# Patient Record
Sex: FEMALE | Race: BLACK OR AFRICAN AMERICAN | NOT HISPANIC OR LATINO | Employment: OTHER | URBAN - METROPOLITAN AREA
[De-identification: names, ages, dates, MRNs, and addresses within clinical notes are randomized per-mention and may not be internally consistent; named-entity substitution may affect disease eponyms.]

---

## 2020-02-11 ENCOUNTER — OFFICE VISIT (OUTPATIENT)
Dept: OTOLARYNGOLOGY | Facility: CLINIC | Age: 60
End: 2020-02-11
Payer: COMMERCIAL

## 2020-02-11 VITALS
SYSTOLIC BLOOD PRESSURE: 113 MMHG | BODY MASS INDEX: 35.19 KG/M2 | WEIGHT: 218.06 LBS | TEMPERATURE: 99 F | HEART RATE: 77 BPM | DIASTOLIC BLOOD PRESSURE: 71 MMHG

## 2020-02-11 DIAGNOSIS — K11.1: Primary | ICD-10-CM

## 2020-02-11 PROCEDURE — 3008F PR BODY MASS INDEX (BMI) DOCUMENTED: ICD-10-PCS | Mod: CPTII,S$GLB,, | Performed by: ORTHOPAEDIC SURGERY

## 2020-02-11 PROCEDURE — 99203 OFFICE O/P NEW LOW 30 MIN: CPT | Mod: S$GLB,,, | Performed by: ORTHOPAEDIC SURGERY

## 2020-02-11 PROCEDURE — 99999 PR PBB SHADOW E&M-NEW PATIENT-LVL III: CPT | Mod: PBBFAC,,, | Performed by: ORTHOPAEDIC SURGERY

## 2020-02-11 PROCEDURE — 3008F BODY MASS INDEX DOCD: CPT | Mod: CPTII,S$GLB,, | Performed by: ORTHOPAEDIC SURGERY

## 2020-02-11 PROCEDURE — 99203 PR OFFICE/OUTPT VISIT, NEW, LEVL III, 30-44 MIN: ICD-10-PCS | Mod: S$GLB,,, | Performed by: ORTHOPAEDIC SURGERY

## 2020-02-11 PROCEDURE — 99999 PR PBB SHADOW E&M-NEW PATIENT-LVL III: ICD-10-PCS | Mod: PBBFAC,,, | Performed by: ORTHOPAEDIC SURGERY

## 2020-02-11 NOTE — PROGRESS NOTES
Subjective:       Patient ID: Georgette Powell is a 59 y.o. female.    Chief Complaint: Thyroid Problem    Patient is a very pleasant 59 year old female here to see me today for evaluation of her submandibular glands.  She has previously seen endocrinology due to thyroid nodules, and those have been biopsied and are stable.  Her endocrinologist made her aware of enlarged submandibular glands about two years ago, and she is here for evaluation.  She has seen two outside ENT's as well.  She has not required any antibiotics for sialoadenitis, and has not had any pain in the area.  She sometimes has some swelling in the area, but does not relate to eating.  She sometimes has dry mouth, she attributes to dehydration.  She did note that she had a neck lift about three years ago, just prior to noticing the enlarged submandibular glands.    Review of Systems   Constitutional: Negative for chills, fatigue, fever and unexpected weight change.   HENT: Negative for congestion, dental problem, ear discharge, ear pain, facial swelling, hearing loss, nosebleeds, postnasal drip, rhinorrhea, sinus pressure, sneezing, sore throat, tinnitus, trouble swallowing and voice change.    Eyes: Negative for redness, itching and visual disturbance.   Respiratory: Negative for cough, choking, shortness of breath and wheezing.    Cardiovascular: Negative for chest pain and palpitations.   Gastrointestinal: Negative for abdominal pain.        No reflux.   Musculoskeletal: Negative for gait problem.   Skin: Negative for rash.   Neurological: Negative for dizziness, light-headedness and headaches.       Objective:      Physical Exam   Constitutional: She is oriented to person, place, and time. She appears well-developed and well-nourished. No distress.   HENT:   Head: Normocephalic and atraumatic.   Right Ear: Tympanic membrane, external ear and ear canal normal.   Left Ear: Tympanic membrane, external ear and ear canal normal.   Nose: Nose normal.  No mucosal edema, rhinorrhea, nasal deformity or septal deviation. No epistaxis. Right sinus exhibits no maxillary sinus tenderness and no frontal sinus tenderness. Left sinus exhibits no maxillary sinus tenderness and no frontal sinus tenderness.   Mouth/Throat: Uvula is midline, oropharynx is clear and moist and mucous membranes are normal. Mucous membranes are not pale and not dry. No dental caries. No oropharyngeal exudate or posterior oropharyngeal erythema.   Eyes: Pupils are equal, round, and reactive to light. Conjunctivae, EOM and lids are normal. Right eye exhibits no chemosis. Left eye exhibits no chemosis. Right conjunctiva is not injected. Left conjunctiva is not injected. No scleral icterus. Right eye exhibits normal extraocular motion and no nystagmus. Left eye exhibits normal extraocular motion and no nystagmus.   Neck: Trachea normal and phonation normal. No tracheal tenderness present. No tracheal deviation present. Thyromegaly present. No thyroid mass present.   Ptotic submandibular glands bilaterally, symmetric and soft, no mass palpated, floor of mouth soft, no stones appreciated   Cardiovascular: Intact distal pulses.   Pulmonary/Chest: Effort normal. No stridor. No respiratory distress.   Abdominal: She exhibits no distension.   Lymphadenopathy:        Head (right side): No submental, no submandibular, no preauricular, no posterior auricular and no occipital adenopathy present.        Head (left side): No submental, no submandibular, no preauricular, no posterior auricular and no occipital adenopathy present.     She has no cervical adenopathy.   Neurological: She is alert and oriented to person, place, and time. No cranial nerve deficit.   Skin: Skin is warm and dry. No rash noted. No erythema.   Psychiatric: She has a normal mood and affect. Her behavior is normal.       Assessment:       1. Hypertrophy of submandibular gland        Plan:       1.  Submandibular gland enlargement:   Subjectively, she feels that her submandibular glands are enlarged, on physical examination there soft and benign though they are ptotic.  In retrospect, she does note that she had a neck lift just prior to noticing that her submandibular glands appeared enlarged.  We did discuss that parted the neck and face lift is often suturing the fascia beneath the submandibular glands to help elevate them.  No intervention necessary at this time.   RTC prn.

## 2020-08-04 ENCOUNTER — OFFICE VISIT (OUTPATIENT)
Dept: DERMATOLOGY | Facility: CLINIC | Age: 60
End: 2020-08-04
Payer: COMMERCIAL

## 2020-08-04 DIAGNOSIS — D22.9 BENIGN NEVUS OF SKIN: ICD-10-CM

## 2020-08-04 DIAGNOSIS — D23.9 DERMATOFIBROMA: Primary | ICD-10-CM

## 2020-08-04 DIAGNOSIS — L82.1 SEBORRHEIC KERATOSIS: ICD-10-CM

## 2020-08-04 PROCEDURE — 99202 OFFICE O/P NEW SF 15 MIN: CPT | Mod: S$GLB,,, | Performed by: DERMATOLOGY

## 2020-08-04 PROCEDURE — 99999 PR PBB SHADOW E&M-EST. PATIENT-LVL III: ICD-10-PCS | Mod: PBBFAC,,, | Performed by: DERMATOLOGY

## 2020-08-04 PROCEDURE — 99202 PR OFFICE/OUTPT VISIT, NEW, LEVL II, 15-29 MIN: ICD-10-PCS | Mod: S$GLB,,, | Performed by: DERMATOLOGY

## 2020-08-04 PROCEDURE — 99999 PR PBB SHADOW E&M-EST. PATIENT-LVL III: CPT | Mod: PBBFAC,,, | Performed by: DERMATOLOGY

## 2020-08-04 RX ORDER — VIT C/E/ZN/COPPR/LUTEIN/ZEAXAN 250MG-90MG
4 CAPSULE ORAL
COMMUNITY
End: 2023-12-14

## 2020-08-04 RX ORDER — FERROUS GLUCONATE 324(38)MG
1 TABLET ORAL
COMMUNITY
End: 2023-12-14

## 2020-08-04 RX ORDER — LANOLIN ALCOHOL/MO/W.PET/CERES
400 CREAM (GRAM) TOPICAL
COMMUNITY
End: 2023-12-14

## 2020-08-04 RX ORDER — KRILL/OM-3/DHA/EPA/PHOSPHO/AST 1000-230MG
1 CAPSULE ORAL
COMMUNITY
End: 2023-12-14

## 2020-08-04 NOTE — PROGRESS NOTES
Subjective:       Patient ID:  Georgette Powell is a 59 y.o. female who presents for   Chief Complaint   Patient presents with    Mole     left arm , hurting      History of Present Illness: The patient presents with chief complaint of mole.  Location: left arm   Duration: years, rosening x 4 days  Signs/Symptoms: hurts, pruritus    Prior treatments: none     The patient denies personal or family history of skin cancer.        Review of Systems   Constitutional: Negative for fever and chills.   Gastrointestinal: Negative for nausea and vomiting.   Skin: Positive for activity-related sunscreen use. Negative for daily sunscreen use and recent sunburn.   Hematologic/Lymphatic: Does not bruise/bleed easily.        Objective:    Physical Exam   Constitutional: She appears well-developed and well-nourished. No distress.   Neurological: She is alert and oriented to person, place, and time. She is not disoriented.   Psychiatric: She has a normal mood and affect.   Skin:   Areas Examined (abnormalities noted in diagram):   Head / Face Inspection Performed  Neck Inspection Performed  Chest / Axilla Inspection Performed  Abdomen Inspection Performed  Back Inspection Performed  RUE Inspected  LUE Inspection Performed  Nails and Digits Inspection Performed              Diagram Legend     Erythematous scaling macule/papule c/w actinic keratosis       Vascular papule c/w angioma      Pigmented verrucoid papule/plaque c/w seborrheic keratosis      Yellow umbilicated papule c/w sebaceous hyperplasia      Irregularly shaped tan macule c/w lentigo     1-2 mm smooth white papules consistent with Milia      Movable subcutaneous cyst with punctum c/w epidermal inclusion cyst      Subcutaneous movable cyst c/w pilar cyst      Firm pink to brown papule c/w dermatofibroma      Pedunculated fleshy papule(s) c/w skin tag(s)      Evenly pigmented macule c/w junctional nevus     Mildly variegated pigmented, slightly irregular-bordered macule  c/w mildly atypical nevus      Flesh colored to evenly pigmented papule c/w intradermal nevus       Pink pearly papule/plaque c/w basal cell carcinoma      Erythematous hyperkeratotic cursted plaque c/w SCC      Surgical scar with no sign of skin cancer recurrence      Open and closed comedones      Inflammatory papules and pustules      Verrucoid papule consistent consistent with wart     Erythematous eczematous patches and plaques     Dystrophic onycholytic nail with subungual debris c/w onychomycosis     Umbilicated papule    Erythematous-base heme-crusted tan verrucoid plaque consistent with inflamed seborrheic keratosis     Erythematous Silvery Scaling Plaque c/w Psoriasis     See annotation      Assessment / Plan:        Dermatofibroma  Reassurance given.  Lesions are benign.    Seborrheic keratosis  Reassurance given. Discussed diagnosis and that lesions are benign.      Benign nevus of skin  Reassurance given.  Discussed ABCDEF of melanoma and changes for patient to look for.  AAD Handout given. Discussed importance of daily use of sunscreen which is broad-spectrum and has a minimum SPF of 30.             Follow up if symptoms worsen or fail to improve.

## 2020-08-04 NOTE — PATIENT INSTRUCTIONS
Monitoring Moles  Moles, also called nevi, are small, pigmented (colored) marks on the skin. They have no known purpose. Many moles appear before age 30, but they also increase frequently as people age. Moles most often are benign (not cancer) and harmless. But some become cancerous. Thats why you need to watch the moles on your body and tell your health care provider about any concern you.    What are moles?  Moles are a type of pigmented ginny. Freckles, which often are sprinkled across the bridge of the nose, the cheeks, and the arms, are another type of pigmented ginny. Moles can appear on any part of the body. There are many types, sizes, and shapes of moles. Most moles are solid brown. In most cases, they are flat or dome-shaped, smooth, and have well-defined edges. Freckles are flat.  Why worry about moles?  Most moles are benign and dont require treatment. You can have moles removed if you dont like the way they look or feel. But moles that appear after you are 30 or that change in certain ways may become a problem. These moles may turn into melanoma, a type of skin cancer. Melanoma is one of the fastest growing cancers in the [place], but it is often curable if caught early. But this disease can be life-threatening, particularly when not diagnosed early. The more moles someone has, the higher the risk. Risk is also higher for those who have had more lifetime exposure to the sun, severe blistering sunburns, exposure to tanning beds, a prior personal history of cancer, and those with a family history of skin cancer. To manage your risk, its smart to check your moles for changes and ask your health care provider to perform a thorough skin exam when you have a physical exam. To do this, you first need to learn where your moles are. Then, be sure to check your moles each month.    Checking your moles  You can check many of your moles each month. You can do this right after you shower and before you get  dressed. Check your body from head to toe. Then, make a list of your moles. If you find any new moles or changes in your moles, call your health care provider. To check your moles, youll need:  · A full-length mirror  · A stool or chair to sit on while you check your feet  If you have a lot of moles, take digital photos of them each month. Make sure to take photos both up close and from a distance. These can help you see if any moles change over time.  When to seek medical treatment  See your health care provider if your moles hurt, itch, ooze, bleed, thicken, become crusty, or show other changes. Also, be sure to call your health care provider if your moles show any of the following signs of melanoma:  · A change in size, shape, color, or elevation  · Asymmetry (when the sides dont match)  · Ragged, notched, or blurred borders  · Varied colors within the same mole  · Size is larger than 5 mm or 6 mm in diameter (the size of a pencil eraser)  © 9489-2991 [employer]. [address] [place] [address]. All rights reserved. This information is not intended as a substitute for professional medical care. Always follow your healthcare professional's instructions.

## 2023-08-11 ENCOUNTER — TELEPHONE (OUTPATIENT)
Dept: GASTROENTEROLOGY | Facility: CLINIC | Age: 63
End: 2023-08-11
Payer: COMMERCIAL

## 2023-08-11 NOTE — TELEPHONE ENCOUNTER
----- Message from Piedad Rodriguez sent at 8/10/2023  2:34 PM CDT -----  Name of Who is Calling:Patient           What is the request in detail:Patient is requesting a call regarding colonoscopy due in Jan 2024           Can the clinic reply by [facility]:no           What Number to Call Back if not in [facility]: [phone]

## 2023-08-11 NOTE — TELEPHONE ENCOUNTER
Returned call to pt.     Pt states it will be time for her colonoscopy in Jan 2024 and wants Dr Escamilla to do it.     Advised her that he does Admin duties also and is a little more difficult to get in with however either she can see her PCP and request the colonoscopy and ask for Dr Escamilla or she can see GI and request Dr Escamilla.     She agreed to plan and will call back when the time get closer.

## 2023-09-12 ENCOUNTER — TELEPHONE (OUTPATIENT)
Dept: PREADMISSION TESTING | Facility: HOSPITAL | Age: 63
End: 2023-09-12
Payer: COMMERCIAL

## 2023-09-13 ENCOUNTER — TELEPHONE (OUTPATIENT)
Dept: GASTROENTEROLOGY | Facility: CLINIC | Age: 63
End: 2023-09-13
Payer: COMMERCIAL

## 2023-09-13 NOTE — TELEPHONE ENCOUNTER
----- Message from Gaye Murillo LPN sent at 9/12/2023  3:39 PM CDT -----  Contact: Georgette    ----- Message -----  From: Ijeoma Oneil  Sent: 9/12/2023   1:37 PM CDT  To: [facility] Staff    Georgette needs a call back at [phone], Regards to getting an appointment schedule or orders in to have a upper and lower GI done.    Thanks  Td

## 2023-09-13 NOTE — TELEPHONE ENCOUNTER
Returned pts call. Advised her the first available appt with GI is 02/22/23. Scheduled appt. Advised pt to contact her PCP to see if he can order the EGD and colonoscopy, have him to send it to the Endoscopy schedulers and she may can be scoped before 02/2024.  She states she will keep the appt scheduled for now and will contact her PCP.

## 2023-12-18 ENCOUNTER — TELEPHONE (OUTPATIENT)
Dept: SPORTS MEDICINE | Facility: CLINIC | Age: 63
End: 2023-12-18
Payer: COMMERCIAL

## 2023-12-18 NOTE — TELEPHONE ENCOUNTER
Called pt to get more information regarding where her leg pain was located so we could get appropriate xrays prior to appointment. Pt was unable to explain exactly where he pain was located over the phone and denied locations I inquired about (hip, knee, etc).  Will evaluate pt at appointment and determine if xr are needed

## 2023-12-19 ENCOUNTER — TELEPHONE (OUTPATIENT)
Dept: PHYSICAL MEDICINE AND REHAB | Facility: CLINIC | Age: 63
End: 2023-12-19
Payer: COMMERCIAL

## 2023-12-19 ENCOUNTER — LAB VISIT (OUTPATIENT)
Dept: LAB | Facility: HOSPITAL | Age: 63
End: 2023-12-19
Attending: STUDENT IN AN ORGANIZED HEALTH CARE EDUCATION/TRAINING PROGRAM
Payer: COMMERCIAL

## 2023-12-19 ENCOUNTER — OFFICE VISIT (OUTPATIENT)
Dept: SPORTS MEDICINE | Facility: CLINIC | Age: 63
End: 2023-12-19
Payer: COMMERCIAL

## 2023-12-19 DIAGNOSIS — M79.604 RIGHT LEG PAIN: Primary | ICD-10-CM

## 2023-12-19 DIAGNOSIS — M79.604 RIGHT LEG PAIN: ICD-10-CM

## 2023-12-19 LAB
ALBUMIN SERPL BCP-MCNC: 4 G/DL (ref 3.5–5.2)
ALP SERPL-CCNC: 60 U/L (ref 55–135)
ALT SERPL W/O P-5'-P-CCNC: 9 U/L (ref 10–44)
ANION GAP SERPL CALC-SCNC: 8 MMOL/L (ref 8–16)
AST SERPL-CCNC: 15 U/L (ref 10–40)
BASOPHILS # BLD AUTO: 0.03 K/UL (ref 0–0.2)
BASOPHILS NFR BLD: 0.6 % (ref 0–1.9)
BILIRUB SERPL-MCNC: 0.4 MG/DL (ref 0.1–1)
BUN SERPL-MCNC: 14 MG/DL (ref 8–23)
CALCIUM SERPL-MCNC: 9.8 MG/DL (ref 8.7–10.5)
CHLORIDE SERPL-SCNC: 105 MMOL/L (ref 95–110)
CO2 SERPL-SCNC: 26 MMOL/L (ref 23–29)
CREAT SERPL-MCNC: 0.8 MG/DL (ref 0.5–1.4)
CRP SERPL-MCNC: 0.4 MG/L (ref 0–8.2)
DIFFERENTIAL METHOD: ABNORMAL
EOSINOPHIL # BLD AUTO: 0.1 K/UL (ref 0–0.5)
EOSINOPHIL NFR BLD: 2 % (ref 0–8)
ERYTHROCYTE [DISTWIDTH] IN BLOOD BY AUTOMATED COUNT: 13.7 % (ref 11.5–14.5)
EST. GFR  (NO RACE VARIABLE): >60 ML/MIN/1.73 M^2
GLUCOSE SERPL-MCNC: 78 MG/DL (ref 70–110)
HCT VFR BLD AUTO: 37.6 % (ref 37–48.5)
HGB BLD-MCNC: 11.7 G/DL (ref 12–16)
IMM GRANULOCYTES # BLD AUTO: 0.01 K/UL (ref 0–0.04)
IMM GRANULOCYTES NFR BLD AUTO: 0.2 % (ref 0–0.5)
LYMPHOCYTES # BLD AUTO: 1.9 K/UL (ref 1–4.8)
LYMPHOCYTES NFR BLD: 37.9 % (ref 18–48)
MCH RBC QN AUTO: 27.3 PG (ref 27–31)
MCHC RBC AUTO-ENTMCNC: 31.1 G/DL (ref 32–36)
MCV RBC AUTO: 88 FL (ref 82–98)
MONOCYTES # BLD AUTO: 0.4 K/UL (ref 0.3–1)
MONOCYTES NFR BLD: 7.7 % (ref 4–15)
NEUTROPHILS # BLD AUTO: 2.6 K/UL (ref 1.8–7.7)
NEUTROPHILS NFR BLD: 51.6 % (ref 38–73)
NRBC BLD-RTO: 0 /100 WBC
PLATELET # BLD AUTO: 183 K/UL (ref 150–450)
PMV BLD AUTO: 12.8 FL (ref 9.2–12.9)
POTASSIUM SERPL-SCNC: 4.2 MMOL/L (ref 3.5–5.1)
PROT SERPL-MCNC: 7.7 G/DL (ref 6–8.4)
RBC # BLD AUTO: 4.28 M/UL (ref 4–5.4)
SODIUM SERPL-SCNC: 139 MMOL/L (ref 136–145)
WBC # BLD AUTO: 5.09 K/UL (ref 3.9–12.7)

## 2023-12-19 PROCEDURE — 80053 COMPREHEN METABOLIC PANEL: CPT | Performed by: STUDENT IN AN ORGANIZED HEALTH CARE EDUCATION/TRAINING PROGRAM

## 2023-12-19 PROCEDURE — 1159F MED LIST DOCD IN RCRD: CPT | Mod: CPTII,S$GLB,, | Performed by: STUDENT IN AN ORGANIZED HEALTH CARE EDUCATION/TRAINING PROGRAM

## 2023-12-19 PROCEDURE — 99999 PR PBB SHADOW E&M-EST. PATIENT-LVL III: CPT | Mod: PBBFAC,,, | Performed by: STUDENT IN AN ORGANIZED HEALTH CARE EDUCATION/TRAINING PROGRAM

## 2023-12-19 PROCEDURE — 99204 PR OFFICE/OUTPT VISIT, NEW, LEVL IV, 45-59 MIN: ICD-10-PCS | Mod: S$GLB,,, | Performed by: STUDENT IN AN ORGANIZED HEALTH CARE EDUCATION/TRAINING PROGRAM

## 2023-12-19 PROCEDURE — 99204 OFFICE O/P NEW MOD 45 MIN: CPT | Mod: S$GLB,,, | Performed by: STUDENT IN AN ORGANIZED HEALTH CARE EDUCATION/TRAINING PROGRAM

## 2023-12-19 PROCEDURE — 1160F PR REVIEW ALL MEDS BY PRESCRIBER/CLIN PHARMACIST DOCUMENTED: ICD-10-PCS | Mod: CPTII,S$GLB,, | Performed by: STUDENT IN AN ORGANIZED HEALTH CARE EDUCATION/TRAINING PROGRAM

## 2023-12-19 PROCEDURE — 36415 COLL VENOUS BLD VENIPUNCTURE: CPT | Performed by: STUDENT IN AN ORGANIZED HEALTH CARE EDUCATION/TRAINING PROGRAM

## 2023-12-19 PROCEDURE — 1160F RVW MEDS BY RX/DR IN RCRD: CPT | Mod: CPTII,S$GLB,, | Performed by: STUDENT IN AN ORGANIZED HEALTH CARE EDUCATION/TRAINING PROGRAM

## 2023-12-19 PROCEDURE — 99999 PR PBB SHADOW E&M-EST. PATIENT-LVL III: ICD-10-PCS | Mod: PBBFAC,,, | Performed by: STUDENT IN AN ORGANIZED HEALTH CARE EDUCATION/TRAINING PROGRAM

## 2023-12-19 PROCEDURE — 85652 RBC SED RATE AUTOMATED: CPT | Performed by: STUDENT IN AN ORGANIZED HEALTH CARE EDUCATION/TRAINING PROGRAM

## 2023-12-19 PROCEDURE — 1159F PR MEDICATION LIST DOCUMENTED IN MEDICAL RECORD: ICD-10-PCS | Mod: CPTII,S$GLB,, | Performed by: STUDENT IN AN ORGANIZED HEALTH CARE EDUCATION/TRAINING PROGRAM

## 2023-12-19 PROCEDURE — 85025 COMPLETE CBC W/AUTO DIFF WBC: CPT | Performed by: STUDENT IN AN ORGANIZED HEALTH CARE EDUCATION/TRAINING PROGRAM

## 2023-12-19 PROCEDURE — 86140 C-REACTIVE PROTEIN: CPT | Performed by: STUDENT IN AN ORGANIZED HEALTH CARE EDUCATION/TRAINING PROGRAM

## 2023-12-19 RX ORDER — GABAPENTIN 100 MG/1
100 CAPSULE ORAL 3 TIMES DAILY
Qty: 90 CAPSULE | Refills: 1 | Status: SHIPPED | OUTPATIENT
Start: 2023-12-19 | End: 2024-02-12

## 2023-12-19 NOTE — TELEPHONE ENCOUNTER
----- Message from Odette Nolasco sent at 12/19/2023  2:36 PM CST -----  Contact: Georgette  .Type:  Patient Returning Call     Who Called : Georgette   Who Left Message for Patient: Dayna Doyle LPN  Does the patient know what this is regarding?:   Would the patient rather a call back or a response via [facility]?  Call   Best Call Back Number: [phone]              Thanks

## 2023-12-19 NOTE — PATIENT INSTRUCTIONS
Assessment:  Georgette Powell is a 63 y.o. female with a chief complaint of Pain of the Right Leg    Encounter Diagnosis   Name Primary?    Right leg pain Yes      Plan:  Unclear etiology of pain and symptoms based on history and clinical exam.  Suspect possible radiculopathy affecting the right lower extremity.  No signs to suggest hip or knee pathology.    Recommend to obtain EMG/NCS of the right side, evaluate for radiculopathy.    We will obtain lab - CBC, CMP, ESR, CRP   Can trial gabapentin, 100 mg, 3 times daily  We will follow up after EMG, discuss results, determine the next best course of treatment.    Follow-up:  After EMG/NCS or sooner if there are any problems between now and then.    Thank you for choosing [facility] and Dr. Breezy Henderson for your orthopedic & sports medicine care. It is our goal to provide you with exceptional care that will help keep you healthy, active, and get you back in the game.    Please do not hesitate to reach out to us via email, phone, or MyChart with any questions, concerns, or feedback.    If you are experiencing pain/discomfort ,or have questions after 5pm and would like to be connected to the [facility] on-call team, please call this number and specify which Sports Medicine provider is treating you: [phone]

## 2023-12-19 NOTE — PROGRESS NOTES
Patient ID: Georgette Powell  YOB: 1960  MRN: [id number]    Chief Complaint: Pain of the Right Leg    Referred By: Self    Occupation: Retired    History of Present Illness: Georgette Powell is a 63 y.o. female who presents today with Pain of the Right Leg    She complains of chronic right leg pain since late 2022 without any injuries.  She describes a toothache pain throughout her whole leg that worsens with prolonged walking, sitting, or when flexing her hip.  She has tried using OTC medications and Voltaren gel but the pain persists.  She has not had any formal workup of this before.    Past Medical History:   Past Medical History:   Diagnosis Date    Cataract     Endometriosis of colon 1995    took steroids which relieved the problems    Iron deficiency anemia     due to fibroids    Uterine fibroid      Past Surgical History:   Procedure Laterality Date    BREAST BIOPSY Left     Benign    CATARACT EXTRACTION Right 01/09/2020    CERVICAL BIOPSY  W/ LOOP ELECTRODE EXCISION      COLONOSCOPY  2014    Normal    COLPOSCOPY      [facility]      MYOMECTOMY      REDUCTION OF BOTH BREASTS      TOTAL REDUCTION MAMMOPLASTY Bilateral     2016    UTERINE FIBROID SURGERY       Family History   Problem Relation Age of Onset    Diabetes Maternal Grandmother     Stroke Maternal Grandmother     Diabetes Mother     Hypertension Mother     Breast cancer Neg Hx     Cataracts Mother     Cancer Maternal Aunt         breast    Colon cancer Neg Hx     Stomach cancer Neg Hx      Social History     Socioeconomic History    Marital status: Single    Number of children: 0   Occupational History    Occupation: [employer]      Employer:  [employer]   Tobacco Use    Smoking status: Never    Smokeless tobacco: Never   Substance and Sexual Activity    Alcohol use: Yes     Comment: occasionally    Drug use: Never    Sexual activity: Yes   Social History Narrative    ** Merged History Encounter **          Medication List with Changes/Refills    New Medications    GABAPENTIN (NEURONTIN) 100 MG CAPSULE    Take 1 capsule (100 mg total) by mouth 3 (three) times daily.   Current Medications    ESTRADIOL (ESTRACE) 1 MG TABLET    Take 1 tablet (1 mg total) by mouth once daily.    PROGESTERONE (PROMETRIUM) 100 MG CAPSULE    Take 1 capsule (100 mg total) by mouth once daily.     Review of patient's allergies indicates:  No Known Allergies    Physical Exam:   There is no height or weight on file to calculate BMI.    Physical Exam  Detailed MSK exam:     Right Leg:  Inspection: No swelling, erythema or ecchymosis.   Palpation tenderness: Nontender to palpation  Range of motion: 100 deg Flexion with pain at end range         40 deg IR         65 deg ER  Strength:  5/5 Extension    5/5 Flexion    5/5 Abduction    5/5 Adduction  Special Tests: Negative Log Roll    Negative JYOTSNA    Negative FADIR    Negative Circumduction    Negative SLR  N/V Exam:  Tibial:    Normal sensory (plantar foot)  Normal motor (FHL)    Sup Peroneal:   Normal sensory (dorsal foot)  Normal motor (Peroneals)            Deep Peroneal:   Normal sensory (1st web space)  Normal motor (EHL)    Sural:   Normal sensory (lateral foot)   Saphenous:   Normal sensory (medial lower leg)   Normal pedal pulses, warm and well perfused with capillary refill < 2 sec       Imaging:  No pertinent imaging to review        Patient Instructions   Assessment:  Georgette Powell is a 63 y.o. female with a chief complaint of Pain of the Right Leg    Encounter Diagnosis   Name Primary?    Right leg pain Yes      Plan:  Unclear etiology of pain and symptoms based on history and clinical exam.  Suspect possible radiculopathy affecting the right lower extremity.  No signs to suggest hip or knee pathology.    Recommend to obtain EMG/NCS of the right side, evaluate for radiculopathy.    We will obtain lab - CBC, CMP, ESR, CRP   Can trial gabapentin, 100 mg, 3 times daily  We will follow up after EMG, discuss results, determine  the next best course of treatment.    Follow-up:  After EMG/NCS or sooner if there are any problems between now and then.    Thank you for choosing [facility] and Dr. Breezy Henderson for your orthopedic & sports medicine care. It is our goal to provide you with exceptional care that will help keep you healthy, active, and get you back in the game.    Please do not hesitate to reach out to us via email, phone, or MyChart with any questions, concerns, or feedback.    If you are experiencing pain/discomfort ,or have questions after 5pm and would like to be connected to the [facility] on-call team, please call this number and specify which Sports Medicine provider is treating you: [phone]      A copy of today's visit note has been sent to the referring provider.           Breezy Henderson MD  Primary Care Sports Medicine    Disclaimer: This note was prepared using a voice recognition system and is likely to have sound alike errors within the text.

## 2023-12-20 ENCOUNTER — PATIENT MESSAGE (OUTPATIENT)
Dept: SPORTS MEDICINE | Facility: CLINIC | Age: 63
End: 2023-12-20
Payer: COMMERCIAL

## 2023-12-20 LAB — ERYTHROCYTE [SEDIMENTATION RATE] IN BLOOD BY PHOTOMETRIC METHOD: 28 MM/HR (ref 0–36)

## 2024-01-05 ENCOUNTER — OFFICE VISIT (OUTPATIENT)
Dept: PHYSICAL MEDICINE AND REHAB | Facility: CLINIC | Age: 64
End: 2024-01-05
Payer: COMMERCIAL

## 2024-01-05 VITALS
HEART RATE: 76 BPM | RESPIRATION RATE: 13 BRPM | BODY MASS INDEX: 37.77 KG/M2 | DIASTOLIC BLOOD PRESSURE: 77 MMHG | HEIGHT: 66 IN | WEIGHT: 235 LBS | SYSTOLIC BLOOD PRESSURE: 128 MMHG

## 2024-01-05 DIAGNOSIS — M54.16 LUMBAR RADICULOPATHY: ICD-10-CM

## 2024-01-05 PROCEDURE — 95908 NRV CNDJ TST 3-4 STUDIES: CPT | Mod: S$GLB,,, | Performed by: PHYSICAL MEDICINE & REHABILITATION

## 2024-01-05 PROCEDURE — 99999 PR PBB SHADOW E&M-EST. PATIENT-LVL III: CPT | Mod: PBBFAC,,, | Performed by: PHYSICAL MEDICINE & REHABILITATION

## 2024-01-05 PROCEDURE — 99499 UNLISTED E&M SERVICE: CPT | Mod: S$GLB,,, | Performed by: PHYSICAL MEDICINE & REHABILITATION

## 2024-01-05 PROCEDURE — 95885 MUSC TST DONE W/NERV TST LIM: CPT | Mod: S$GLB,,, | Performed by: PHYSICAL MEDICINE & REHABILITATION

## 2024-01-05 NOTE — PROGRESS NOTES
[facility]   [address]  [place] [address]  Phone: [phone]        Full Name: Georgette Powell YOB: 1960  Patient ID: [id number]      Visit Date: 1/5/2024 12:46  Age: 63 Years 3 Months Old  Examining Physician: Dolly Villanueva M.D.  Referring Physician:   Reason for Referral: lower ext      Chief Complaint   Patient presents with    Leg Pain       HPI: This is a 63 y.o.  female being seen in clinic today for evaluation of chronic deep, achy pain in her low back and down into her right leg. Her symptoms are worse with prolonged standing/walking but can bother her at rest as well.  Position change provides some relief.    History obtained from patient    Past family, medical, social, and surgical history reviewed in chart    Review of Systems:     General- denies lethargy, weight change, fever, chills  Head/neck- denies swallowing difficulties  ENT- denies hearing changes  Cardiovascular-denies chest pain  Pulmonary- denies shortness of breath  GI- denies constipation or bowel incontinence  - denies bladder incontinence  Skin- denies wounds or rashes  Musculoskeletal- denies weakness, + pain  Neurologic- +/- numbness and tingling  Psychiatric- denies depressive or psychotic features, denies anxiety  Lymphatic-denies swelling  Endocrine- denies hypoglycemic symptoms/DM history  All other pertinent systems negative     Physical Examination:  General: Well developed, well nourished female, NAD, +obese habitus  HEENT:NCAT EOMI bilaterally   Pulmonary:Normal respirations    Spinal Examination: CERVICAL  Active ROM is within normal limits.  Inspection: No deformity of spinal alignment.      Spinal Examination: LUMBAR or THORACIC  Active ROM is within normal limits.  Inspection: No deformity of spinal alignment.    Slr neg bilaterally    Bilateral Upper and Lower Extremities:  Pulses are 2+ at radial bilaterally.  Shoulder/Elbow/Wrist/Hand ROM   Hip/Knee/Ankle ROM  wnl  Bilateral Extremities show normal capillary refill.  No signs of cyanosis, rubor, edema, skin changes, or dysvascular changes of appendages.  Nails appear intact.    Neurological Exam:  Cranial Nerves:  II-XII grossly intact    Manual Muscle Testing: (Motor 5=normal)  5/5 strength bilateral lower extremities    No focal atrophy is noted of either lower extremity.    Bilateral Reflexes:  No clonus at knee or ankle.    Sensation: tested to light touch  - intact in legs    Gait: Narrow base and good arm swing.      Entire procedure explained to patient prior to proceeding.  Verbal consent obtained    SNC      Nerve / Sites Rec. Site Onset Lat Peak Lat Amp Segments Distance Velocity     ms ms µV  mm m/s   R Sural - Ankle (Calf)      Calf Ankle 3.5 4.1 10.7 Calf - Ankle 140 40   R Superficial peroneal - Ankle      Lat leg Ankle 2.7 3.2 18.9 Lat leg - Ankle 140 52       MNC      Nerve / Sites Muscle Latency Amplitude Duration Rel Amp Segments Distance Lat Diff Velocity     ms mV ms %  mm ms m/s   R Peroneal - EDB      Ankle EDB 4.0 3.5 6.0 100 Ankle - EDB 80        Fibular head EDB 11.3 3.0 6.2 86.6 Fibular head - Ankle 340 7.3 46      Pop fossa EDB 12.8 3.0 5.9 99 Pop fossa - Fibular head 70 1.5 46   R Tibial - AH      Ankle AH 5.8 5.6 3.9 100 Ankle - Ankle 80        Pop fossa AH 16.3 3.2 4.0 56.7 Pop fossa - Ankle 420 10.5 40       EMG         EMG Summary Table     Spontaneous MUAP Recruitment   Muscle IA Fib PSW Fasc Other Dur. Dur Amp Dur Polys Pattern Effort   R. Rectus femoris N None None None . _NFT_ _NFT_ N N N N Max   R. Extensor digitorum brevis N None None None . _NFT_ _NFT_ N N 1+ sl red Max   R. Abductor hallucis Incr 1+ None None . _NFT_ _NFT_ N Sl Incr 1+ Reduced Max                              INTERPRETATION    -Right superficial peroneal sensory nerve conduction study showed normal peak latency and amplitude  -Right sural sensory nerve conduction study showed normal peak latency and  amplitude  -Right peroneal motor nerve conduction study showed normal latency, amplitude, and conduction velocity  -Right tibial motor nerve conduction study showed normal latency, amplitude, and conduction velocity *prox stim limited due to habitus  -Needle EMG examination performed to above mentioned muscles     IMPRESSION  ABNORMAL study  2. There is electrodiagnostic evidence of a mild acute on chronic radiculopathy of the S1 nerve root and a chronic radiculopathy of the L5 nerve root    PLAN  Discussed in detail for greater than 30 minutes about diagnosis and treatment plan    1. Follow up with referring provider: Dr. Breezy Henderson  2. Handouts on lumbar radic and exercise provided. Rec referral to PT and IPM  3. This study is good for one year. If symptoms worsen or do not improve, please re-consult.    Dolly Villanueva M.D.  Physical Medicine and Rehab

## 2024-01-08 NOTE — PROGRESS NOTES
Patient ID: Georgette Powell  YOB: 1960  MRN: [id number]    Chief Complaint: Pain of the Right Leg    Referred By: Self    Occupation: Retired    History of Present Illness: Georgette Powell is a 63 y.o. female who presents today with Pain of the Right Leg    She was initially evaluated in our office on 12/19/23 for right leg pain that had been present since 2022.  Presentation was concerning for lower extremity neuropathy.   She was referred for an EMG and labs to evaluate further.  She was prescribed gabapentin 100mg TID.    Today, she reports mild improvement in her symptoms.  It has not as bothersome.  She has been taking gabapentin 100 mg 3 times daily.  No other concerns today.    HPI 12/19/23:  She complains of chronic right leg pain since late 2022 without any injuries.  She describes a toothache pain throughout her whole leg that worsens with prolonged walking, sitting, or when flexing her hip.  She has tried using OTC medications and Voltaren gel but the pain persists.  She has not had any formal workup of this before.    Past Medical History:   Past Medical History:   Diagnosis Date    Cataract     Endometriosis of colon 1995    took steroids which relieved the problems    Iron deficiency anemia     due to fibroids    Uterine fibroid      Past Surgical History:   Procedure Laterality Date    BREAST BIOPSY Left     Benign    CATARACT EXTRACTION Right 01/09/2020    CERVICAL BIOPSY  W/ LOOP ELECTRODE EXCISION      COLONOSCOPY  2014    Normal    COLPOSCOPY      [facility]      MYOMECTOMY      REDUCTION OF BOTH BREASTS      TOTAL REDUCTION MAMMOPLASTY Bilateral     2016    UTERINE FIBROID SURGERY       Family History   Problem Relation Age of Onset    Diabetes Maternal Grandmother     Stroke Maternal Grandmother     Diabetes Mother     Hypertension Mother     Breast cancer Neg Hx     Cataracts Mother     Cancer Maternal Aunt         breast    Colon cancer Neg Hx     Stomach cancer Neg Hx      Social  History     Socioeconomic History    Marital status: Single    Number of children: 0   Occupational History    Occupation: [employer]      Employer:  [employer]   Tobacco Use    Smoking status: Never    Smokeless tobacco: Never   Substance and Sexual Activity    Alcohol use: Yes     Comment: occasionally    Drug use: Never    Sexual activity: Yes   Social History Narrative    ** Merged History Encounter **          Medication List with Changes/Refills   Current Medications    ESTRADIOL (ESTRACE) 1 MG TABLET    Take 1 tablet (1 mg total) by mouth once daily.    GABAPENTIN (NEURONTIN) 100 MG CAPSULE    Take 1 capsule (100 mg total) by mouth 3 (three) times daily.    PROGESTERONE (PROMETRIUM) 100 MG CAPSULE    Take 1 capsule (100 mg total) by mouth once daily.     Review of patient's allergies indicates:  No Known Allergies    Physical Exam:   There is no height or weight on file to calculate BMI.    Physical Exam  Detailed MSK exam:     Right Leg:  Inspection: No swelling, erythema or ecchymosis.   Palpation tenderness: Nontender to palpation  Range of motion: 100 deg Flexion with pain at end range         40 deg IR         65 deg ER  Strength:  5/5 Extension    5/5 Flexion    5/5 Abduction    5/5 Adduction  Special Tests: Negative Log Roll    Negative JYOTSNA    Negative FADIR    Negative Circumduction    Negative SLR  N/V Exam:  Tibial:    Normal sensory (plantar foot)  Normal motor (FHL)    Sup Peroneal:   Normal sensory (dorsal foot)  Normal motor (Peroneals)            Deep Peroneal:   Normal sensory (1st web space)  Normal motor (EHL)    Sural:   Normal sensory (lateral foot)   Saphenous:   Normal sensory (medial lower leg)   Normal pedal pulses, warm and well perfused with capillary refill < 2 sec       Imaging:    EMG reviewed - 01/05/2024  Mild acute on chronic radiculopathy of the S1 nerve root.    Mild chronic radiculopathy of the L5 nerve root.    Patient Instructions   Assessment:  Georgette Powell is a 63 y.o.  female with a chief complaint of Pain of the Right Leg    Encounter Diagnosis   Name Primary?    Right lumbosacral radiculopathy Yes      Plan:  EMG reviewed, showing mild acute on chronic radiculopathy of S1 nerve root as well as chronic radiculopathy of the L5 nerve root.  Discussed these findings, this is consistent with her pain and symptoms.  Pain has improved somewhat already, with activity modifications and gabapentin.    Would recommend formal physical therapy, and we will refer to [facility] in [place].  Anticipate 2-3 times a week for at least the next 6-8 weeks.    Can continue gabapentin, 100 mg, 3 times daily, as needed.    2-3 months, would recommend referral to interventional pain department to discuss possible intervention/injections.    Follow-up:  2-3 months, if needed, or sooner if there are any problems between now and then.    Thank you for choosing [facility] and Dr. Breezy Henderson for your orthopedic & sports medicine care. It is our goal to provide you with exceptional care that will help keep you healthy, active, and get you back in the game.    Please do not hesitate to reach out to us via email, phone, or MyChart with any questions, concerns, or feedback.    If you are experiencing pain/discomfort ,or have questions after 5pm and would like to be connected to the [facility] on-call team, please call this number and specify which Sports Medicine provider is treating you: [phone]      A copy of today's visit note has been sent to the referring provider.           Breezy Henderson MD  Primary Care Sports Medicine    Disclaimer: This note was prepared using a voice recognition system and is likely to have sound alike errors within the text.

## 2024-01-09 ENCOUNTER — OFFICE VISIT (OUTPATIENT)
Dept: SPORTS MEDICINE | Facility: CLINIC | Age: 64
End: 2024-01-09
Payer: COMMERCIAL

## 2024-01-09 DIAGNOSIS — M54.17 RIGHT LUMBOSACRAL RADICULOPATHY: Primary | ICD-10-CM

## 2024-01-09 PROCEDURE — 99999 PR PBB SHADOW E&M-EST. PATIENT-LVL III: CPT | Mod: PBBFAC,,, | Performed by: STUDENT IN AN ORGANIZED HEALTH CARE EDUCATION/TRAINING PROGRAM

## 2024-01-09 PROCEDURE — 1160F RVW MEDS BY RX/DR IN RCRD: CPT | Mod: CPTII,S$GLB,, | Performed by: STUDENT IN AN ORGANIZED HEALTH CARE EDUCATION/TRAINING PROGRAM

## 2024-01-09 PROCEDURE — 1159F MED LIST DOCD IN RCRD: CPT | Mod: CPTII,S$GLB,, | Performed by: STUDENT IN AN ORGANIZED HEALTH CARE EDUCATION/TRAINING PROGRAM

## 2024-01-09 PROCEDURE — 99214 OFFICE O/P EST MOD 30 MIN: CPT | Mod: S$GLB,,, | Performed by: STUDENT IN AN ORGANIZED HEALTH CARE EDUCATION/TRAINING PROGRAM

## 2024-01-09 NOTE — PATIENT INSTRUCTIONS
Assessment:  Georgette Powell is a 63 y.o. female with a chief complaint of Pain of the Right Leg    Encounter Diagnosis   Name Primary?    Right lumbosacral radiculopathy Yes      Plan:  EMG reviewed, showing mild acute on chronic radiculopathy of S1 nerve root as well as chronic radiculopathy of the L5 nerve root.  Discussed these findings, this is consistent with her pain and symptoms.  Pain has improved somewhat already, with activity modifications and gabapentin.    Would recommend formal physical therapy, and we will refer to [facility] in [place].  Anticipate 2-3 times a week for at least the next 6-8 weeks.    Can continue gabapentin, 100 mg, 3 times daily, as needed.    2-3 months, would recommend referral to interventional pain department to discuss possible intervention/injections.    Follow-up:  2-3 months, if needed, or sooner if there are any problems between now and then.    Thank you for choosing [facility] and Dr. Breezy Henderson for your orthopedic & sports medicine care. It is our goal to provide you with exceptional care that will help keep you healthy, active, and get you back in the game.    Please do not hesitate to reach out to us via email, phone, or MyChart with any questions, concerns, or feedback.    If you are experiencing pain/discomfort ,or have questions after 5pm and would like to be connected to the [facility] on-call team, please call this number and specify which Sports Medicine provider is treating you: [phone]

## 2024-02-12 RX ORDER — GABAPENTIN 100 MG/1
100 CAPSULE ORAL 3 TIMES DAILY
Qty: 90 CAPSULE | Refills: 0 | Status: SHIPPED | OUTPATIENT
Start: 2024-02-12

## 2024-03-26 ENCOUNTER — TELEPHONE (OUTPATIENT)
Dept: GASTROENTEROLOGY | Facility: CLINIC | Age: 64
End: 2024-03-26
Payer: COMMERCIAL

## 2024-03-26 NOTE — TELEPHONE ENCOUNTER
contacted regarding appointment scheduled on 04/11/24 with ; pt states that she has spoken with her pcp and is in the process of scheduling with another GI provider due to repeated cancellation

## 2024-04-01 ENCOUNTER — TELEPHONE (OUTPATIENT)
Dept: GASTROENTEROLOGY | Facility: CLINIC | Age: 64
End: 2024-04-01
Payer: COMMERCIAL

## 2024-04-01 NOTE — TELEPHONE ENCOUNTER
Call returned to , pt states that a referral has been forwarded to the GI clinic for her to have her colonoscopy performed by a GI provider . Pt informed that she must first establish with a GI provider . Pt states that she was informed that no schedule with a provider is needed to have her colonoscopy performed. Pt informed that, that is incorrect and she must first see a provider. Pt agreed and has been scheduled to see  on 08/22/24

## 2024-04-01 NOTE — TELEPHONE ENCOUNTER
----- Message from Andrey Boateng MA sent at 4/1/2024 11:33 AM CDT -----  The patient calling to get scheduled for a colonoscopy from referral scanned in October 2023. Please give her a call back at [phone].

## 2024-04-02 ENCOUNTER — TELEPHONE (OUTPATIENT)
Dept: PREADMISSION TESTING | Facility: HOSPITAL | Age: 64
End: 2024-04-02
Payer: COMMERCIAL

## 2024-04-02 DIAGNOSIS — Z12.11 SCREENING FOR COLON CANCER: Primary | ICD-10-CM

## 2024-04-02 DIAGNOSIS — Z12.11 ENCOUNTER FOR SCREENING COLONOSCOPY: ICD-10-CM

## 2024-04-02 DIAGNOSIS — Z12.11 COLON CANCER SCREENING: Primary | ICD-10-CM

## 2024-05-03 ENCOUNTER — HOSPITAL ENCOUNTER (OUTPATIENT)
Dept: PREADMISSION TESTING | Facility: HOSPITAL | Age: 64
Discharge: HOME | End: 2024-05-03
Attending: INTERNAL MEDICINE | Admitting: INTERNAL MEDICINE
Payer: COMMERCIAL

## 2024-05-03 DIAGNOSIS — Z12.11 COLON CANCER SCREENING: ICD-10-CM

## 2025-04-28 ENCOUNTER — PATIENT MESSAGE (OUTPATIENT)
Dept: PHYSICAL MEDICINE AND REHAB | Facility: CLINIC | Age: 65
End: 2025-04-28
Payer: COMMERCIAL

## 2025-06-11 ENCOUNTER — OFFICE VISIT (OUTPATIENT)
Dept: PAIN MEDICINE | Facility: CLINIC | Age: 65
End: 2025-06-11
Payer: COMMERCIAL

## 2025-06-11 VITALS
SYSTOLIC BLOOD PRESSURE: 120 MMHG | WEIGHT: 243.63 LBS | DIASTOLIC BLOOD PRESSURE: 78 MMHG | BODY MASS INDEX: 39.15 KG/M2 | HEIGHT: 66 IN | HEART RATE: 69 BPM

## 2025-06-11 DIAGNOSIS — M54.16 LUMBAR RADICULOPATHY, CHRONIC: Primary | ICD-10-CM

## 2025-06-11 DIAGNOSIS — M54.16 RIGHT LUMBAR RADICULOPATHY: ICD-10-CM

## 2025-06-11 PROCEDURE — 3008F BODY MASS INDEX DOCD: CPT | Mod: CPTII,S$GLB,, | Performed by: PHYSICIAN ASSISTANT

## 2025-06-11 PROCEDURE — 99204 OFFICE O/P NEW MOD 45 MIN: CPT | Mod: S$GLB,,, | Performed by: PHYSICIAN ASSISTANT

## 2025-06-11 PROCEDURE — 3044F HG A1C LEVEL LT 7.0%: CPT | Mod: CPTII,S$GLB,, | Performed by: PHYSICIAN ASSISTANT

## 2025-06-11 PROCEDURE — 3078F DIAST BP <80 MM HG: CPT | Mod: CPTII,S$GLB,, | Performed by: PHYSICIAN ASSISTANT

## 2025-06-11 PROCEDURE — 3074F SYST BP LT 130 MM HG: CPT | Mod: CPTII,S$GLB,, | Performed by: PHYSICIAN ASSISTANT

## 2025-06-11 PROCEDURE — 99999 PR PBB SHADOW E&M-EST. PATIENT-LVL III: CPT | Mod: PBBFAC,,, | Performed by: PHYSICIAN ASSISTANT

## 2025-06-11 PROCEDURE — 1159F MED LIST DOCD IN RCRD: CPT | Mod: CPTII,S$GLB,, | Performed by: PHYSICIAN ASSISTANT

## 2025-06-11 NOTE — PROGRESS NOTES
New Patient Chronic Pain Note (Initial Visit)    Referring Physician: Dolly Villanueva MD    PCP: Milagros Vasquez NP    Chief Complaint:   Chief Complaint   Patient presents with    Leg Pain        SUBJECTIVE:    Georgette Powell is a 64 y.o. female who presents to the clinic for the evaluation of right leg pain. The pain started  a year ago following exercises. She exacerbated the pain after she fell on mother's day and her condition became worse.  The pain is located in the RLE. She does feel a burning sensation throughout right lateral hip and thigh. She has pain in the right calf.  The pain is described as burning and throbbing and is rated as 2/10. The pain is rated with a score of  0/10 on the BEST day and a score of 10/10 on the WORST day.  Symptoms interfere with daily activity and sleeping. The pain is exacerbated by Walking.  The pain is mitigated by heat and medications.     Patient denies urinary incontinence, bowel incontinence, and significant motor weakness.    Pain Disability Index Review:          No data to display                Non-Pharmacologic Treatments:  Physical Therapy/Home Exercise: yes  Ice/Heat:yes  TENS: no  Acupuncture: yes  Massage: no  Chiropractic: yes    Other: no      Pain Medications:  - Adjuvant Medications: Advil,Motrin ( Ibuprofen) and Neurontin (Gabapentin)     report:  Reviewed and consistent with medication use as prescribed.    Pain Procedures:   None, no previous back injections or surgery      Imaging/ Diagnostic Studies/ Labs (Reviewed on 6/11/2025):    EMG  January 2024  IMPRESSION  ABNORMAL study  2. There is electrodiagnostic evidence of a mild acute on chronic radiculopathy of the S1 nerve root and a chronic radiculopathy of the L5 nerve root    Past Medical History:   Diagnosis Date    Abnormal Pap smear of cervix     Cataract     Endometriosis of colon 01/01/1995    took steroids which relieved the problems    Iron deficiency anemia     due to fibroids     Uterine fibroid      Past Surgical History:   Procedure Laterality Date    BREAST BIOPSY Left     Benign    CATARACT EXTRACTION Right 01/09/2020    CERVICAL BIOPSY  W/ LOOP ELECTRODE EXCISION      COLONOSCOPY  2014    Normal    COLPOSCOPY      ECC      MYOMECTOMY      REDUCTION OF BOTH BREASTS      TOTAL REDUCTION MAMMOPLASTY Bilateral     2016    UTERINE FIBROID SURGERY       Social History[1]  Family History   Problem Relation Name Age of Onset    Diabetes Maternal Grandmother      Stroke Maternal Grandmother      Diabetes Mother      Hypertension Mother      Breast cancer Neg Hx      Cataracts Mother      Cancer Maternal Aunt          breast    Colon cancer Neg Hx      Stomach cancer Neg Hx         Review of patient's allergies indicates:  No Known Allergies    Current Outpatient Medications   Medication Sig    estradioL (ESTRACE) 1 MG tablet Take 1 tablet (1 mg total) by mouth once daily.    progesterone (PROMETRIUM) 100 MG capsule Take 1 capsule by mouth once daily     No current facility-administered medications for this visit.       Review of Systems     GENERAL:  No weight loss, malaise or fevers.  HEENT:   No recent changes in vision or hearing  NECK:  Negative for lumps, no difficulty with swallowing.  RESPIRATORY:  Negative for cough, wheezing or shortness of breath, patient denies any recent URI.  CARDIOVASCULAR:  Negative for chest pain, leg swelling or palpitations.  GI:  Negative for abdominal discomfort, blood in stools or black stools or change in bowel habits.  MUSCULOSKELETAL:  See HPI.  SKIN:  Negative for lesions, rash, and itching.  PSYCH:  No mood disorder or recent psychosocial stressors.  Patients sleep is not disturbed secondary to pain.  HEMATOLOGY/LYMPHOLOGY:  Negative for prolonged bleeding, bruising easily or swollen nodes.  Patient is not currently taking any anti-coagulants  NEURO:   No history of headaches, syncope, paralysis, seizures or tremors.  All other reviewed and negative  "other than HPI.    OBJECTIVE:    /78   Pulse 69   Ht 5' 6" (1.676 m)   Wt 110.5 kg (243 lb 9.7 oz)   BMI 39.32 kg/m²         Physical Exam    GENERAL: Well appearing, in no acute distress, alert and oriented x3.  PSYCH:  Mood and affect appropriate.  SKIN: Skin color, texture, turgor normal, no rashes or lesions.  HEAD/FACE:  Normocephalic, atraumatic. Cranial nerves grossly intact.  NECK: No pain to palpation over the cervical paraspinous muscles. Spurling Negative. No pain with neck flexion, extension, or lateral flexion.   CV: RRR with palpation of the radial artery.  PULM: No evidence of respiratory difficulty, symmetric chest rise.  GI:  Soft and non-tender.  BACK: Straight leg raising in the sitting and supine positions is positive to radicular pain on the right. No pain to palpation over the facet joints of the lumbar spine or spinous processes. Normal range of motion without pain reproduction.   EXTREMITIES: Peripheral joint ROM is full and pain free without obvious instability or laxity in all four extremities. No deformities, edema, or skin discoloration. Good capillary refill.  MUSCULOSKELETAL: Shoulder, hip, and knee provocative maneuvers are negative.  There is no pain with palpation over the sacroiliac joints bilaterally.  FABERs test is negative.  FADIRs test is negative.   Bilateral upper and lower extremity strength is normal and symmetric.  No atrophy or tone abnormalities are noted.  NEURO: Bilateral upper and lower extremity coordination and muscle stretch reflexes are physiologic and symmetric.  Plantar response are downgoing. No clonus.  No loss of sensation is noted.  GAIT: normal.      LABS:  Lab Results   Component Value Date    WBC 4.9 02/25/2025    HGB 11.8 02/25/2025    HCT 38.2 02/25/2025    MCV 88 12/19/2023     02/25/2025       CMP  Sodium   Date Value Ref Range Status   12/19/2023 139 136 - 145 mmol/L Final     Potassium   Date Value Ref Range Status   12/19/2023 4.2 " 3.5 - 5.1 mmol/L Final     Chloride   Date Value Ref Range Status   12/19/2023 105 95 - 110 mmol/L Final     CO2   Date Value Ref Range Status   12/19/2023 26 23 - 29 mmol/L Final     Glucose   Date Value Ref Range Status   12/19/2023 78 70 - 110 mg/dL Final     BUN   Date Value Ref Range Status   12/19/2023 14 8 - 23 mg/dL Final     Creatinine   Date Value Ref Range Status   12/19/2023 0.8 0.5 - 1.4 mg/dL Final     Calcium   Date Value Ref Range Status   12/19/2023 9.8 8.7 - 10.5 mg/dL Final     Total Protein   Date Value Ref Range Status   12/19/2023 7.7 6.0 - 8.4 g/dL Final     Albumin   Date Value Ref Range Status   12/19/2023 4.0 3.5 - 5.2 g/dL Final     Total Bilirubin   Date Value Ref Range Status   12/19/2023 0.4 0.1 - 1.0 mg/dL Final     Comment:     For infants and newborns, interpretation of results should be based  on gestational age, weight and in agreement with clinical  observations.    Premature Infant recommended reference ranges:  Up to 24 hours.............<8.0 mg/dL  Up to 48 hours............<12.0 mg/dL  3-5 days..................<15.0 mg/dL  6-29 days.................<15.0 mg/dL       Alkaline Phosphatase   Date Value Ref Range Status   12/19/2023 60 55 - 135 U/L Final     AST   Date Value Ref Range Status   12/19/2023 15 10 - 40 U/L Final     ALT   Date Value Ref Range Status   12/19/2023 9 (L) 10 - 44 U/L Final     Anion Gap   Date Value Ref Range Status   12/19/2023 8 8 - 16 mmol/L Final     eGFR if    Date Value Ref Range Status   04/12/2016 >60.0 >60 mL/min/1.73 m^2 Final     eGFR if non    Date Value Ref Range Status   04/12/2016 >60.0 >60 mL/min/1.73 m^2 Final     Comment:     Calculation used to obtain the estimated glomerular filtration  rate (eGFR) is the CKD-EPI equation. Since race is unknown   in our information system, the eGFR values for   -American and Non--American patients are given   for each creatinine result.         Lab Results    Component Value Date    HGBA1C 5.1 02/25/2025             ASSESSMENT: 64 y.o. year old female with RLE pain, consistent with     1. Lumbar radiculopathy, chronic  MRI Lumbar Spine Without Contrast      2. Right lumbar radiculopathy  MRI Lumbar Spine Without Contrast            PLAN:   - Interventions: Will consider Lumbar TF Epidural Steroid Injection after MRI is completed. Patient is insterested in lumbar intervention to address her pain.    - Anticoagulation use: None      - Medications: I have stressed the importance of physical activity and a home exercise plan to help with pain and improve health. and Patient can continue with medications for now since they are providing benefits, using them appropriately, and without side effects.      -- Can take Tylenol (acetaminophen) 1000mg (two tablets of 500mg) 3x per day as needed for pain (to take up to max dose of 3000mg per day).     - Continue Gabapentin 100 mg daily.          - Therapy: Patient has participated in physical therapy in the past.      - Imaging: Reviewed available imaging with patient and answered any questions they had regarding study.Order MRI of the Lumbar Spine to help evaluate possible source of pain.    - Consults/Referrals:    - Records:  Reviewed/Obtain old records from outside physicians and imaging    - Follow up visit: Will follow up after MRI is completed. Virtual visit is appropriate.    - Counseled patient regarding the importance of activity modification, constant sleeping habits, and physical therapy    - This condition does not require this patient to take time off of work, and the primary goal of our Pain Management services is to improve the patient's functional capacity.    - Patient Questions: Answered all of the patient's questions regarding diagnosis, therapy, and treatment        The above plan and management options were discussed at length with patient. Patient is in agreement with the above and verbalized  understanding.    I discussed the goals of interventional chronic pain management with the patient on today's visit.  I explained the utility of injections for diagnostic and therapeutic purposes.  We discussed a multimodal approach to pain including treating the patient's given worst pain at any given time.  We will use a systematic approach to addressing pain.  We will also adopt a multimodal approach that includes injections, adjuvant medications, physical therapy, at times psychiatry.  There may be a limited role for opioid use intermittently in the treatment of pain, more particularly for acute pain although no one approach can be used as a sole treatment modality.    I emphasized the importance of regular exercise, core strengthening and stretching, diet and weight loss as a cornerstone of long-term pain management.      Ramírez Sampson PA-C  Interventional Pain Management  [facility]    Disclaimer:  This note was prepared using voice recognition system and is likely to have sound alike errors that may have been overlooked even after proof reading.  Please call me with any questions         [1]   Social History  Socioeconomic History    Marital status: Single    Number of children: 0   Occupational History    Occupation: [employer]      Employer:  [employer]   Tobacco Use    Smoking status: Never    Smokeless tobacco: Never   Substance and Sexual Activity    Alcohol use: Yes     Comment: occasionally    Drug use: Never    Sexual activity: Yes   Social History Narrative    ** Merged History Encounter **          Social Drivers of Health     Financial Resource Strain: Patient Declined (6/2/2025)    Received from [facility] Its Subsidiaries and Affiliates    Overall Financial Resource Strain (CARDIA)     Difficulty of Paying Living Expenses: Patient declined   Food Insecurity: Patient Declined (6/2/2025)    Received from [facility]  [facility] and Its Subsidiaries and Affiliates    Hunger Vital Sign     Worried About Running Out of Food in the Last Year: Patient declined     Ran Out of Food in the Last Year: Patient declined   Transportation Needs: Patient Declined (6/2/2025)    [facility]    PRAPARE - Transportation     Lack of Transportation (Medical): Patient declined     Lack of Transportation (Non-Medical): Patient declined   Physical Activity: Insufficiently Active (6/2/2025)    [facility]    Exercise Vital Sign     Days of Exercise per Week: 2 days     Minutes of Exercise per Session: 30 min   Stress: No Stress Concern Present (6/2/2025)    [facility]    [employer] [place] [employer] - Occupational Stress Questionnaire     Feeling of Stress : Not at all   Housing Stability: Patient Declined (6/2/2025)    [facility]    Housing Stability Vital Sign     Unable to Pay for Housing in the Last Year: Patient declined     Homeless in the Last Year: Patient declined

## 2025-06-17 ENCOUNTER — TELEPHONE (OUTPATIENT)
Dept: PAIN MEDICINE | Facility: CLINIC | Age: 65
End: 2025-06-17
Payer: COMMERCIAL

## 2025-06-17 ENCOUNTER — TELEPHONE (OUTPATIENT)
Dept: PHYSICAL MEDICINE AND REHAB | Facility: CLINIC | Age: 65
End: 2025-06-17
Payer: COMMERCIAL

## 2025-06-17 NOTE — TELEPHONE ENCOUNTER
Copied from CRM #[id number]. Topic: General Inquiry - Return Call  >> Jun 17, 2025 10:08 AM Valerie wrote:  .Type:  Patient  Call    Who Called:Georgette  Does the patient know what this is regarding?:pt needs a nurse to reach back out to her regarding some questions she needs to discuss   Would the patient rather a call back or a response via [facility]? Call back  Best Call Back Number:.[phone]    Additional Information:

## 2025-06-17 NOTE — TELEPHONE ENCOUNTER
Returned patient's phone call. Patient inquired about obtaining records. Provided her with information on calling medical records and what office visits she would need pertaining to a fall she had recently. Patient appreciative and v/u.  RD

## 2025-06-23 ENCOUNTER — HOSPITAL ENCOUNTER (OUTPATIENT)
Dept: RADIOLOGY | Facility: HOSPITAL | Age: 65
Discharge: HOME | End: 2025-06-23
Attending: PHYSICIAN ASSISTANT
Payer: COMMERCIAL

## 2025-06-23 DIAGNOSIS — M54.16 LUMBAR RADICULOPATHY, CHRONIC: ICD-10-CM

## 2025-06-23 DIAGNOSIS — M54.16 RIGHT LUMBAR RADICULOPATHY: ICD-10-CM

## 2025-06-23 PROCEDURE — 72148 MRI LUMBAR SPINE W/O DYE: CPT | Mod: 26,,, | Performed by: RADIOLOGY

## 2025-06-23 PROCEDURE — 72148 MRI LUMBAR SPINE W/O DYE: CPT | Mod: TC

## 2025-06-25 ENCOUNTER — TELEPHONE (OUTPATIENT)
Dept: PAIN MEDICINE | Facility: CLINIC | Age: 65
End: 2025-06-25
Payer: COMMERCIAL

## 2025-06-25 ENCOUNTER — OFFICE VISIT (OUTPATIENT)
Dept: PAIN MEDICINE | Facility: CLINIC | Age: 65
End: 2025-06-25
Payer: COMMERCIAL

## 2025-06-25 VITALS — BODY MASS INDEX: 39.32 KG/M2 | HEIGHT: 66 IN

## 2025-06-25 DIAGNOSIS — M51.362 DEGENERATION OF INTERVERTEBRAL DISC OF LUMBAR REGION WITH DISCOGENIC BACK PAIN AND LOWER EXTREMITY PAIN: ICD-10-CM

## 2025-06-25 DIAGNOSIS — M54.16 RIGHT LUMBAR RADICULOPATHY: Primary | ICD-10-CM

## 2025-06-25 PROCEDURE — 98013 SYNCH AUDIO-ONLY EST LOW 20: CPT | Mod: 93,,, | Performed by: PHYSICIAN ASSISTANT

## 2025-06-25 PROCEDURE — 3044F HG A1C LEVEL LT 7.0%: CPT | Mod: CPTII,93,, | Performed by: PHYSICIAN ASSISTANT

## 2025-06-25 NOTE — H&P (VIEW-ONLY)
Audio Only Telehealth Visit     The patient location is: home  The chief complaint leading to consultation is: MRI review  Visit type: Virtual visit with audio only (telephone)  Total time spent in medical discussion with patient: 10-15 minutes  Total time spent on date of the encounter:20 minutes       The reason for the audio only service rather than synchronous audio and video virtual visit was related to technical difficulties or patient preference/necessity.       Each patient to whom I provide medical services by telemedicine is:  (1) informed of the relationship between the physician and patient and the respective role of any other health care provider with respect to management of the patient; and (2) notified that they may decline to receive medical services by telemedicine and may withdraw from such care at any time. Patient verbally consented to receive this service via voice-only telephone call.      Est Patient Chronic Pain Note (Follow up Visit)    Referring Physician: No ref. provider found    PCP: Milagros Vasquez NP    Chief Complaint:   Chief Complaint   Patient presents with    Follow-up        SUBJECTIVE:    Interval History (6/25/2025): Georgette Powell presents today for follow-up visit.  Patient was last seen on 6/11/2025. At that visit, the plan was to complete MRI of lumbar spine. Patient reports pain as 6/10 today.  She denies changes since her last visit. She still c/o burning and throbbing pain in the RLE.     Initial HPI (6/11/2025):  Georgette Powell is a 64 y.o. female who presents to the clinic for the evaluation of right leg pain. The pain started  a year ago following exercises. She exacerbated the pain after she fell on mother's day and her condition became worse.  The pain is located in the RLE. She does feel a burning sensation throughout right lateral hip and thigh. She has pain in the right calf.  The pain is described as burning and throbbing and is rated as 2/10. The pain is  rated with a score of  0/10 on the BEST day and a score of 10/10 on the WORST day.  Symptoms interfere with daily activity and sleeping. The pain is exacerbated by Walking.  The pain is mitigated by heat and medications.     Patient denies urinary incontinence, bowel incontinence, and significant motor weakness.    Pain Disability Index Review:          No data to display                Non-Pharmacologic Treatments:  Physical Therapy/Home Exercise: yes  Ice/Heat:yes  TENS: no  Acupuncture: yes  Massage: no  Chiropractic: yes    Other: no      Pain Medications:  - Adjuvant Medications: Advil,Motrin ( Ibuprofen) and Neurontin (Gabapentin)     report:  Reviewed and consistent with medication use as prescribed.    Pain Procedures:   None, no previous back injections or surgery      Imaging/ Diagnostic Studies/ Labs (Reviewed on 6/25/2025):      Results for orders placed during the hospital encounter of 06/23/25    MRI Lumbar Spine Without Contrast    Narrative  EXAMINATION:  MRI LUMBAR SPINE WITHOUT CONTRAST    CLINICAL HISTORY:  Lumbar radiculopathy, symptoms persist with conservative treatment; Radiculopathy, lumbar region    TECHNIQUE:  Multiplanar, multisequence MR images were acquired from the thoracolumbar junction to the sacrum without contrast.    COMPARISON:  None.    FINDINGS:  Alignment: L3-L4 and L4-L5 grade 1 anterolisthesis.    Vertebrae: Lumbar vertebral body heights are maintained.  Minor multilevel endplate degenerative changes.  No significant endplate edema.  No evidence of acute fracture.  L3-L4 mild bilateral facet edema.  Marrow signal is otherwise within normal limits.    Discs: L3-L4 and L4-L5 disc desiccation and height loss.    Cord: Normal.  Conus terminates at L1-L2.    Degenerative findings:    T12-L1: No significant posterior disc bulge, spinal canal stenosis or neural foraminal stenosis.    L1-L2: Minor asymmetric right disc bulge.  Mild right-sided neural foraminal stenosis.  No  significant spinal canal stenosis.    L2-L3: Mild broad-based posterior disc bulge and mild bilateral facet arthropathy contribute to mild bilateral neural foraminal stenosis.  No significant spinal canal stenosis.    L3-L4: Grade 1 anterolisthesis.  Mild broad-based posterior disc bulge, facet arthropathy and ligamentum flavum hypertrophy contribute to mild spinal canal stenosis, moderate right and mild-to-moderate left-sided neural foraminal stenosis.    L4-L5: Grade 1 anterolisthesis.  Mild broad-based posterior disc bulge and facet arthropathy contribute to mild bilateral neural foraminal stenosis.  No significant spinal canal stenosis.    L5-S1: No significant posterior disc bulge or spinal canal stenosis.  Left greater than right facet arthropathy without significant spinal canal stenosis or neural foraminal stenosis.    Paraspinal muscles & soft tissues: Paraspinal soft tissues appear within normal limits.  Partially visualized enlarged uterus with numerous fibroids.    Impression  1. Multilevel lumbar degenerative changes are most pronounced at L3-L4 with grade 1 anterolisthesis contributing to mild spinal canal stenosis, moderate right and mild-to-moderate left-sided neural foraminal stenosis.  2. L4-L5 grade 1 anterolisthesis contributes to mild bilateral neural foraminal stenosis.  3. L1-L2 and L2-L3 mild neural foraminal stenosis.  4. L3-L4 facet joint edema may contribute to back pain.  5. Large, leiomyomatous uterus.      Electronically signed by: Martin Hopper  Date:    06/23/2025  Time:    09:53     EMG  January 2024  IMPRESSION  ABNORMAL study  2. There is electrodiagnostic evidence of a mild acute on chronic radiculopathy of the S1 nerve root and a chronic radiculopathy of the L5 nerve root    Past Medical History:   Diagnosis Date    Abnormal Pap smear of cervix     Cataract     Endometriosis of colon 01/01/1995    took steroids which relieved the problems    Iron deficiency anemia     due to  fibroids    Uterine fibroid      Past Surgical History:   Procedure Laterality Date    BREAST BIOPSY Left     Benign    CATARACT EXTRACTION Right 01/09/2020    CERVICAL BIOPSY  W/ LOOP ELECTRODE EXCISION      COLONOSCOPY  2014    Normal    COLPOSCOPY      ECC      MYOMECTOMY      REDUCTION OF BOTH BREASTS      TOTAL REDUCTION MAMMOPLASTY Bilateral     2016    UTERINE FIBROID SURGERY       Social History[1]  Family History   Problem Relation Name Age of Onset    Diabetes Maternal Grandmother      Stroke Maternal Grandmother      Diabetes Mother      Hypertension Mother      Breast cancer Neg Hx      Cataracts Mother      Cancer Maternal Aunt          breast    Colon cancer Neg Hx      Stomach cancer Neg Hx         Review of patient's allergies indicates:  No Known Allergies    Current Outpatient Medications   Medication Sig    estradioL (ESTRACE) 1 MG tablet Take 1 tablet (1 mg total) by mouth once daily.    progesterone (PROMETRIUM) 100 MG capsule Take 1 capsule by mouth once daily     No current facility-administered medications for this visit.       Review of Systems     GENERAL:  No weight loss, malaise or fevers.  HEENT:   No recent changes in vision or hearing  NECK:  Negative for lumps, no difficulty with swallowing.  RESPIRATORY:  Negative for cough, wheezing or shortness of breath, patient denies any recent URI.  CARDIOVASCULAR:  Negative for chest pain, leg swelling or palpitations.  GI:  Negative for abdominal discomfort, blood in stools or black stools or change in bowel habits.  MUSCULOSKELETAL:  See HPI.  SKIN:  Negative for lesions, rash, and itching.  PSYCH:  No mood disorder or recent psychosocial stressors.  Patients sleep is not disturbed secondary to pain.  HEMATOLOGY/LYMPHOLOGY:  Negative for prolonged bleeding, bruising easily or swollen nodes.  Patient is not currently taking any anti-coagulants  NEURO:   No history of headaches, syncope, paralysis, seizures or tremors.  All other reviewed and  "negative other than HPI.    OBJECTIVE:    Telemedicine Physical Exam:   Vitals:    06/25/25 1143   Height: 5' 6" (1.676 m)     Body mass index is 39.32 kg/m².   (reviewed on 6/25/2025)     (Physical exam performed virtually with patient guided on specific actions and diagnostic maneuvers)  GENERAL: Well appearing, in no acute distress, alert and oriented x3.  Cooperative.  PSYCH:  Mood and affect appropriate.  SKIN: Skin color & texture with no obvious abnormalities.    HEAD/FACE:  Normocephalic, atraumatic.    PULM:  No difficulty breathing. No nasal flaring. No obvious wheezing.  EXTREMITIES: No obvious deformities. Moving all extremities well, appears to have symmetric strength throughout.  MUSCULOSKELETAL: No obvious atrophy abnormalities are noted.   NEURO: No obvious neurologic deficit.   GAIT: sitting.     Physical Exam: last in clinic visit:    Physical Exam    GENERAL: Well appearing, in no acute distress, alert and oriented x3.  PSYCH:  Mood and affect appropriate.  SKIN: Skin color, texture, turgor normal, no rashes or lesions.  HEAD/FACE:  Normocephalic, atraumatic. Cranial nerves grossly intact.  NECK: No pain to palpation over the cervical paraspinous muscles. Spurling Negative. No pain with neck flexion, extension, or lateral flexion.   CV: RRR with palpation of the radial artery.  PULM: No evidence of respiratory difficulty, symmetric chest rise.  GI:  Soft and non-tender.  BACK: Straight leg raising in the sitting and supine positions is positive to radicular pain on the right. No pain to palpation over the facet joints of the lumbar spine or spinous processes. Normal range of motion without pain reproduction.   EXTREMITIES: Peripheral joint ROM is full and pain free without obvious instability or laxity in all four extremities. No deformities, edema, or skin discoloration. Good capillary refill.  MUSCULOSKELETAL: Shoulder, hip, and knee provocative maneuvers are negative.  There is no pain with " palpation over the sacroiliac joints bilaterally.  FABERs test is negative.  FADIRs test is negative.   Bilateral upper and lower extremity strength is normal and symmetric.  No atrophy or tone abnormalities are noted.  NEURO: Bilateral upper and lower extremity coordination and muscle stretch reflexes are physiologic and symmetric.  Plantar response are downgoing. No clonus.  No loss of sensation is noted.  GAIT: normal.      LABS:  Lab Results   Component Value Date    WBC 4.9 02/25/2025    HGB 11.8 02/25/2025    HCT 38.2 02/25/2025    MCV 88 12/19/2023     02/25/2025       CMP  Sodium   Date Value Ref Range Status   12/19/2023 139 136 - 145 mmol/L Final     Potassium   Date Value Ref Range Status   12/19/2023 4.2 3.5 - 5.1 mmol/L Final     Chloride   Date Value Ref Range Status   12/19/2023 105 95 - 110 mmol/L Final     CO2   Date Value Ref Range Status   12/19/2023 26 23 - 29 mmol/L Final     Glucose   Date Value Ref Range Status   12/19/2023 78 70 - 110 mg/dL Final     BUN   Date Value Ref Range Status   12/19/2023 14 8 - 23 mg/dL Final     Creatinine   Date Value Ref Range Status   12/19/2023 0.8 0.5 - 1.4 mg/dL Final     Calcium   Date Value Ref Range Status   12/19/2023 9.8 8.7 - 10.5 mg/dL Final     Total Protein   Date Value Ref Range Status   12/19/2023 7.7 6.0 - 8.4 g/dL Final     Albumin   Date Value Ref Range Status   12/19/2023 4.0 3.5 - 5.2 g/dL Final     Total Bilirubin   Date Value Ref Range Status   12/19/2023 0.4 0.1 - 1.0 mg/dL Final     Comment:     For infants and newborns, interpretation of results should be based  on gestational age, weight and in agreement with clinical  observations.    Premature Infant recommended reference ranges:  Up to 24 hours.............<8.0 mg/dL  Up to 48 hours............<12.0 mg/dL  3-5 days..................<15.0 mg/dL  6-29 days.................<15.0 mg/dL       Alkaline Phosphatase   Date Value Ref Range Status   12/19/2023 60 55 - 135 U/L Final      AST   Date Value Ref Range Status   12/19/2023 15 10 - 40 U/L Final     ALT   Date Value Ref Range Status   12/19/2023 9 (L) 10 - 44 U/L Final     Anion Gap   Date Value Ref Range Status   12/19/2023 8 8 - 16 mmol/L Final     eGFR if    Date Value Ref Range Status   04/12/2016 >60.0 >60 mL/min/1.73 m^2 Final     eGFR if non    Date Value Ref Range Status   04/12/2016 >60.0 >60 mL/min/1.73 m^2 Final     Comment:     Calculation used to obtain the estimated glomerular filtration  rate (eGFR) is the CKD-EPI equation. Since race is unknown   in our information system, the eGFR values for   -American and Non--American patients are given   for each creatinine result.         Lab Results   Component Value Date    HGBA1C 5.1 02/25/2025     ASSESSMENT: 64 y.o. year old female with RLE pain, consistent with     1. Right lumbar radiculopathy  Case Request-RAD/Other Procedure Area: Right Lumbar L3/4 + L4/5 TF SHAYY      2. Degeneration of intervertebral disc of lumbar region with discogenic back pain and lower extremity pain  Case Request-RAD/Other Procedure Area: Right Lumbar L3/4 + L4/5 TF SHAYY          PLAN:   - Interventions: Schedule patient for Right Lumbar L3/4 + L4/5 TF Epidural Steroid Injections to help with lumbar radiculopathy. Explained the risks and benefits of the procedure in detail with the patient today in clinic along with alternative treatment options, and the patient elected to pursue the intervention.        - Anticoagulation use: None      - Medications: I have stressed the importance of physical activity and a home exercise plan to help with pain and improve health. and Patient can continue with medications for now since they are providing benefits, using them appropriately, and without side effects.      -- Can take Tylenol (acetaminophen) 1000mg (two tablets of 500mg) 3x per day as needed for pain (to take up to max dose of 3000mg per day).     - Continue  Gabapentin 100 mg daily.          - Therapy: Patient has participated in physical therapy in the past.      - Imaging: Reviewed available imaging with patient and answered any questions they had regarding study.    - Consults/Referrals:None at this time.    - Records:  Reviewed/Obtain old records from outside physicians and imaging    - Follow up visit: 4 weeks after intervention.    - Counseled patient regarding the importance of activity modification, constant sleeping habits, and physical therapy    - This condition does not require this patient to take time off of work, and the primary goal of our Pain Management services is to improve the patient's functional capacity.    - Patient Questions: Answered all of the patient's questions regarding diagnosis, therapy, and treatment        The above plan and management options were discussed at length with patient. Patient is in agreement with the above and verbalized understanding.    I discussed the goals of interventional chronic pain management with the patient on today's visit.  I explained the utility of injections for diagnostic and therapeutic purposes.  We discussed a multimodal approach to pain including treating the patient's given worst pain at any given time.  We will use a systematic approach to addressing pain.  We will also adopt a multimodal approach that includes injections, adjuvant medications, physical therapy, at times psychiatry.  There may be a limited role for opioid use intermittently in the treatment of pain, more particularly for acute pain although no one approach can be used as a sole treatment modality.    I emphasized the importance of regular exercise, core strengthening and stretching, diet and weight loss as a cornerstone of long-term pain management.      Ramírez Sampson PA-C  Interventional Pain Management  [facility]    Disclaimer:  This note was prepared using voice recognition system and is likely to have sound alike  errors that may have been overlooked even after proof reading.  Please call me with any questions                     This service was not originating from a related E/M service provided within the previous 7 days nor will  to an E/M service or procedure within the next 24 hours or my soonest available appointment.  Prevailing standard of care was able to be met in this audio-only visit.                 [1]   Social History  Socioeconomic History    Marital status: Single    Number of children: 0   Occupational History    Occupation: [employer]      Employer:  [employer]   Tobacco Use    Smoking status: Never    Smokeless tobacco: Never   Substance and Sexual Activity    Alcohol use: Yes     Comment: occasionally    Drug use: Never    Sexual activity: Yes   Social History Narrative    ** Merged History Encounter **          Social Drivers of Health     Financial Resource Strain: Patient Declined (6/2/2025)    [facility]    Overall Financial Resource Strain (CARDIA)     Difficulty of Paying Living Expenses: Patient declined   Food Insecurity: Patient Declined (6/2/2025)    Received from [facility] Subsidi[facility] and [facility]    Hunger Vital Sign     Worried About Running Out of Food in the Last Year: Patient declined     Ran Out of Food in the Last Year: Patient declined   Transportation Needs: Patient Declined (6/2/2025)    Received from [facility] Subsidi[facility] and Aff[facility]ates    PRAPARE - Transportation     Lack of Transportation (Medical): Patient declined     Lack of Transportation (Non-Medical): Patient declined   Physical Activity: Insufficiently Active (6/2/2025)    Received from [facility] Subsidi[facility] and Affiliates    Exercise Vital Sign     Days of Exercise per Week: 2 days      Minutes of Exercise per Session: 30 min   Stress: No Stress Concern Present (6/2/2025)    Received from [facility] Subsidiaries and Aff[facility]ates    [employer] [place] [facility] [employer] - Occupational Stress Questionnaire     Feeling of Stress : Not at all   Housing Stability: Patient Declined (6/2/2025)    Received from [facility] Its Subsidiaries and Affiliates    Housing Stability Vital Sign     Unable to Pay for Housing in the Last Year: Patient declined     Homeless in the Last Year: Patient declined

## 2025-06-25 NOTE — PROGRESS NOTES
Audio Only Telehealth Visit     The patient location is: home  The chief complaint leading to consultation is: MRI review  Visit type: Virtual visit with audio only (telephone)  Total time spent in medical discussion with patient: 10-15 minutes  Total time spent on date of the encounter:20 minutes       The reason for the audio only service rather than synchronous audio and video virtual visit was related to technical difficulties or patient preference/necessity.       Each patient to whom I provide medical services by telemedicine is:  (1) informed of the relationship between the physician and patient and the respective role of any other health care provider with respect to management of the patient; and (2) notified that they may decline to receive medical services by telemedicine and may withdraw from such care at any time. Patient verbally consented to receive this service via voice-only telephone call.      Est Patient Chronic Pain Note (Follow up Visit)    Referring Physician: No ref. provider found    PCP: Milagros Vasquez NP    Chief Complaint:   Chief Complaint   Patient presents with    Follow-up        SUBJECTIVE:    Interval History (6/25/2025): Georgette Powell presents today for follow-up visit.  Patient was last seen on 6/11/2025. At that visit, the plan was to complete MRI of lumbar spine. Patient reports pain as 6/10 today.  She denies changes since her last visit. She still c/o burning and throbbing pain in the RLE.     Initial HPI (6/11/2025):  Georgette Powell is a 64 y.o. female who presents to the clinic for the evaluation of right leg pain. The pain started  a year ago following exercises. She exacerbated the pain after she fell on mother's day and her condition became worse.  The pain is located in the RLE. She does feel a burning sensation throughout right lateral hip and thigh. She has pain in the right calf.  The pain is described as burning and throbbing and is rated as 2/10. The pain is  rated with a score of  0/10 on the BEST day and a score of 10/10 on the WORST day.  Symptoms interfere with daily activity and sleeping. The pain is exacerbated by Walking.  The pain is mitigated by heat and medications.     Patient denies urinary incontinence, bowel incontinence, and significant motor weakness.    Pain Disability Index Review:          No data to display                Non-Pharmacologic Treatments:  Physical Therapy/Home Exercise: yes  Ice/Heat:yes  TENS: no  Acupuncture: yes  Massage: no  Chiropractic: yes    Other: no      Pain Medications:  - Adjuvant Medications: Advil,Motrin ( Ibuprofen) and Neurontin (Gabapentin)     report:  Reviewed and consistent with medication use as prescribed.    Pain Procedures:   None, no previous back injections or surgery      Imaging/ Diagnostic Studies/ Labs (Reviewed on 6/25/2025):      Results for orders placed during the hospital encounter of 06/23/25    MRI Lumbar Spine Without Contrast    Narrative  EXAMINATION:  MRI LUMBAR SPINE WITHOUT CONTRAST    CLINICAL HISTORY:  Lumbar radiculopathy, symptoms persist with conservative treatment; Radiculopathy, lumbar region    TECHNIQUE:  Multiplanar, multisequence MR images were acquired from the thoracolumbar junction to the sacrum without contrast.    COMPARISON:  None.    FINDINGS:  Alignment: L3-L4 and L4-L5 grade 1 anterolisthesis.    Vertebrae: Lumbar vertebral body heights are maintained.  Minor multilevel endplate degenerative changes.  No significant endplate edema.  No evidence of acute fracture.  L3-L4 mild bilateral facet edema.  Marrow signal is otherwise within normal limits.    Discs: L3-L4 and L4-L5 disc desiccation and height loss.    Cord: Normal.  Conus terminates at L1-L2.    Degenerative findings:    T12-L1: No significant posterior disc bulge, spinal canal stenosis or neural foraminal stenosis.    L1-L2: Minor asymmetric right disc bulge.  Mild right-sided neural foraminal stenosis.  No  significant spinal canal stenosis.    L2-L3: Mild broad-based posterior disc bulge and mild bilateral facet arthropathy contribute to mild bilateral neural foraminal stenosis.  No significant spinal canal stenosis.    L3-L4: Grade 1 anterolisthesis.  Mild broad-based posterior disc bulge, facet arthropathy and ligamentum flavum hypertrophy contribute to mild spinal canal stenosis, moderate right and mild-to-moderate left-sided neural foraminal stenosis.    L4-L5: Grade 1 anterolisthesis.  Mild broad-based posterior disc bulge and facet arthropathy contribute to mild bilateral neural foraminal stenosis.  No significant spinal canal stenosis.    L5-S1: No significant posterior disc bulge or spinal canal stenosis.  Left greater than right facet arthropathy without significant spinal canal stenosis or neural foraminal stenosis.    Paraspinal muscles & soft tissues: Paraspinal soft tissues appear within normal limits.  Partially visualized enlarged uterus with numerous fibroids.    Impression  1. Multilevel lumbar degenerative changes are most pronounced at L3-L4 with grade 1 anterolisthesis contributing to mild spinal canal stenosis, moderate right and mild-to-moderate left-sided neural foraminal stenosis.  2. L4-L5 grade 1 anterolisthesis contributes to mild bilateral neural foraminal stenosis.  3. L1-L2 and L2-L3 mild neural foraminal stenosis.  4. L3-L4 facet joint edema may contribute to back pain.  5. Large, leiomyomatous uterus.      Electronically signed by: Martin Hopper  Date:    06/23/2025  Time:    09:53     EMG  January 2024  IMPRESSION  ABNORMAL study  2. There is electrodiagnostic evidence of a mild acute on chronic radiculopathy of the S1 nerve root and a chronic radiculopathy of the L5 nerve root    Past Medical History:   Diagnosis Date    Abnormal Pap smear of cervix     Cataract     Endometriosis of colon 01/01/1995    took steroids which relieved the problems    Iron deficiency anemia     due to  fibroids    Uterine fibroid      Past Surgical History:   Procedure Laterality Date    BREAST BIOPSY Left     Benign    CATARACT EXTRACTION Right 01/09/2020    CERVICAL BIOPSY  W/ LOOP ELECTRODE EXCISION      COLONOSCOPY  2014    Normal    COLPOSCOPY      ECC      MYOMECTOMY      REDUCTION OF BOTH BREASTS      TOTAL REDUCTION MAMMOPLASTY Bilateral     2016    UTERINE FIBROID SURGERY       Social History[1]  Family History   Problem Relation Name Age of Onset    Diabetes Maternal Grandmother      Stroke Maternal Grandmother      Diabetes Mother      Hypertension Mother      Breast cancer Neg Hx      Cataracts Mother      Cancer Maternal Aunt          breast    Colon cancer Neg Hx      Stomach cancer Neg Hx         Review of patient's allergies indicates:  No Known Allergies    Current Outpatient Medications   Medication Sig    estradioL (ESTRACE) 1 MG tablet Take 1 tablet (1 mg total) by mouth once daily.    progesterone (PROMETRIUM) 100 MG capsule Take 1 capsule by mouth once daily     No current facility-administered medications for this visit.       Review of Systems     GENERAL:  No weight loss, malaise or fevers.  HEENT:   No recent changes in vision or hearing  NECK:  Negative for lumps, no difficulty with swallowing.  RESPIRATORY:  Negative for cough, wheezing or shortness of breath, patient denies any recent URI.  CARDIOVASCULAR:  Negative for chest pain, leg swelling or palpitations.  GI:  Negative for abdominal discomfort, blood in stools or black stools or change in bowel habits.  MUSCULOSKELETAL:  See HPI.  SKIN:  Negative for lesions, rash, and itching.  PSYCH:  No mood disorder or recent psychosocial stressors.  Patients sleep is not disturbed secondary to pain.  HEMATOLOGY/LYMPHOLOGY:  Negative for prolonged bleeding, bruising easily or swollen nodes.  Patient is not currently taking any anti-coagulants  NEURO:   No history of headaches, syncope, paralysis, seizures or tremors.  All other reviewed and  "negative other than HPI.    OBJECTIVE:    Telemedicine Physical Exam:   Vitals:    06/25/25 1143   Height: 5' 6" (1.676 m)     Body mass index is 39.32 kg/m².   (reviewed on 6/25/2025)     (Physical exam performed virtually with patient guided on specific actions and diagnostic maneuvers)  GENERAL: Well appearing, in no acute distress, alert and oriented x3.  Cooperative.  PSYCH:  Mood and affect appropriate.  SKIN: Skin color & texture with no obvious abnormalities.    HEAD/FACE:  Normocephalic, atraumatic.    PULM:  No difficulty breathing. No nasal flaring. No obvious wheezing.  EXTREMITIES: No obvious deformities. Moving all extremities well, appears to have symmetric strength throughout.  MUSCULOSKELETAL: No obvious atrophy abnormalities are noted.   NEURO: No obvious neurologic deficit.   GAIT: sitting.     Physical Exam: last in clinic visit:    Physical Exam    GENERAL: Well appearing, in no acute distress, alert and oriented x3.  PSYCH:  Mood and affect appropriate.  SKIN: Skin color, texture, turgor normal, no rashes or lesions.  HEAD/FACE:  Normocephalic, atraumatic. Cranial nerves grossly intact.  NECK: No pain to palpation over the cervical paraspinous muscles. Spurling Negative. No pain with neck flexion, extension, or lateral flexion.   CV: RRR with palpation of the radial artery.  PULM: No evidence of respiratory difficulty, symmetric chest rise.  GI:  Soft and non-tender.  BACK: Straight leg raising in the sitting and supine positions is positive to radicular pain on the right. No pain to palpation over the facet joints of the lumbar spine or spinous processes. Normal range of motion without pain reproduction.   EXTREMITIES: Peripheral joint ROM is full and pain free without obvious instability or laxity in all four extremities. No deformities, edema, or skin discoloration. Good capillary refill.  MUSCULOSKELETAL: Shoulder, hip, and knee provocative maneuvers are negative.  There is no pain with " palpation over the sacroiliac joints bilaterally.  FABERs test is negative.  FADIRs test is negative.   Bilateral upper and lower extremity strength is normal and symmetric.  No atrophy or tone abnormalities are noted.  NEURO: Bilateral upper and lower extremity coordination and muscle stretch reflexes are physiologic and symmetric.  Plantar response are downgoing. No clonus.  No loss of sensation is noted.  GAIT: normal.      LABS:  Lab Results   Component Value Date    WBC 4.9 02/25/2025    HGB 11.8 02/25/2025    HCT 38.2 02/25/2025    MCV 88 12/19/2023     02/25/2025       CMP  Sodium   Date Value Ref Range Status   12/19/2023 139 136 - 145 mmol/L Final     Potassium   Date Value Ref Range Status   12/19/2023 4.2 3.5 - 5.1 mmol/L Final     Chloride   Date Value Ref Range Status   12/19/2023 105 95 - 110 mmol/L Final     CO2   Date Value Ref Range Status   12/19/2023 26 23 - 29 mmol/L Final     Glucose   Date Value Ref Range Status   12/19/2023 78 70 - 110 mg/dL Final     BUN   Date Value Ref Range Status   12/19/2023 14 8 - 23 mg/dL Final     Creatinine   Date Value Ref Range Status   12/19/2023 0.8 0.5 - 1.4 mg/dL Final     Calcium   Date Value Ref Range Status   12/19/2023 9.8 8.7 - 10.5 mg/dL Final     Total Protein   Date Value Ref Range Status   12/19/2023 7.7 6.0 - 8.4 g/dL Final     Albumin   Date Value Ref Range Status   12/19/2023 4.0 3.5 - 5.2 g/dL Final     Total Bilirubin   Date Value Ref Range Status   12/19/2023 0.4 0.1 - 1.0 mg/dL Final     Comment:     For infants and newborns, interpretation of results should be based  on gestational age, weight and in agreement with clinical  observations.    Premature Infant recommended reference ranges:  Up to 24 hours.............<8.0 mg/dL  Up to 48 hours............<12.0 mg/dL  3-5 days..................<15.0 mg/dL  6-29 days.................<15.0 mg/dL       Alkaline Phosphatase   Date Value Ref Range Status   12/19/2023 60 55 - 135 U/L Final      AST   Date Value Ref Range Status   12/19/2023 15 10 - 40 U/L Final     ALT   Date Value Ref Range Status   12/19/2023 9 (L) 10 - 44 U/L Final     Anion Gap   Date Value Ref Range Status   12/19/2023 8 8 - 16 mmol/L Final     eGFR if    Date Value Ref Range Status   04/12/2016 >60.0 >60 mL/min/1.73 m^2 Final     eGFR if non    Date Value Ref Range Status   04/12/2016 >60.0 >60 mL/min/1.73 m^2 Final     Comment:     Calculation used to obtain the estimated glomerular filtration  rate (eGFR) is the CKD-EPI equation. Since race is unknown   in our information system, the eGFR values for   -American and Non--American patients are given   for each creatinine result.         Lab Results   Component Value Date    HGBA1C 5.1 02/25/2025     ASSESSMENT: 64 y.o. year old female with RLE pain, consistent with     1. Right lumbar radiculopathy  Case Request-RAD/Other Procedure Area: Right Lumbar L3/4 + L4/5 TF SHAYY      2. Degeneration of intervertebral disc of lumbar region with discogenic back pain and lower extremity pain  Case Request-RAD/Other Procedure Area: Right Lumbar L3/4 + L4/5 TF SHAYY          PLAN:   - Interventions: Schedule patient for Right Lumbar L3/4 + L4/5 TF Epidural Steroid Injections to help with lumbar radiculopathy. Explained the risks and benefits of the procedure in detail with the patient today in clinic along with alternative treatment options, and the patient elected to pursue the intervention.        - Anticoagulation use: None      - Medications: I have stressed the importance of physical activity and a home exercise plan to help with pain and improve health. and Patient can continue with medications for now since they are providing benefits, using them appropriately, and without side effects.      -- Can take Tylenol (acetaminophen) 1000mg (two tablets of 500mg) 3x per day as needed for pain (to take up to max dose of 3000mg per day).     - Continue  Gabapentin 100 mg daily.          - Therapy: Patient has participated in physical therapy in the past.      - Imaging: Reviewed available imaging with patient and answered any questions they had regarding study.    - Consults/Referrals:None at this time.    - Records:  Reviewed/Obtain old records from outside physicians and imaging    - Follow up visit: 4 weeks after intervention.    - Counseled patient regarding the importance of activity modification, constant sleeping habits, and physical therapy    - This condition does not require this patient to take time off of work, and the primary goal of our Pain Management services is to improve the patient's functional capacity.    - Patient Questions: Answered all of the patient's questions regarding diagnosis, therapy, and treatment        The above plan and management options were discussed at length with patient. Patient is in agreement with the above and verbalized understanding.    I discussed the goals of interventional chronic pain management with the patient on today's visit.  I explained the utility of injections for diagnostic and therapeutic purposes.  We discussed a multimodal approach to pain including treating the patient's given worst pain at any given time.  We will use a systematic approach to addressing pain.  We will also adopt a multimodal approach that includes injections, adjuvant medications, physical therapy, at times psychiatry.  There may be a limited role for opioid use intermittently in the treatment of pain, more particularly for acute pain although no one approach can be used as a sole treatment modality.    I emphasized the importance of regular exercise, core strengthening and stretching, diet and weight loss as a cornerstone of long-term pain management.      Ramírez Sampson PA-C  Interventional Pain Management  [facility]    Disclaimer:  This note was prepared using voice recognition system and is likely to have sound alike  errors that may have been overlooked even after proof reading.  Please call me with any questions                     This service was not originating from a related E/M service provided within the previous 7 days nor will  to an E/M service or procedure within the next 24 hours or my soonest available appointment.  Prevailing standard of care was able to be met in this audio-only visit.                 [1]   Social History  Socioeconomic History    Marital status: Single    Number of children: 0   Occupational History    Occupation: [employer]      Employer:  [employer]   Tobacco Use    Smoking status: Never    Smokeless tobacco: Never   Substance and Sexual Activity    Alcohol use: Yes     Comment: occasionally    Drug use: Never    Sexual activity: Yes   Social History Narrative    ** Merged History Encounter **          Social Drivers of Health     Financial Resource Strain: Patient Declined (6/2/2025)    [facility]    Overall Financial Resource Strain (CARDIA)     Difficulty of Paying Living Expenses: Patient declined   Food Insecurity: Patient Declined (6/2/2025)    Received from "[facility] Subsidi[facility] and Affiliates    Hunger Vital Sign     Worried About Running Out of Food in the Last Year: Patient declined     Ran Out of Food in the Last Year: Patient declined   Transportation Needs: Patient Declined (6/2/2025)    Received from [facility] Its Subsidiaries and Affiliates    PRAPARE - Transportation     Lack of Transportation (Medical): Patient declined     Lack of Transportation (Non-Medical): Patient declined   Physical Activity: Insufficiently Active (6/2/2025)    Received from [facility] Its Subsidiaries and Affiliates    Exercise Vital Sign     Days of Exercise per Week: 2 days      Minutes of Exercise per Session: 30 min   Stress: No Stress Concern Present (6/2/2025)    Received from [facility] Subsidiaries and Affiliates    [employer] - Occupational Stress Questionnaire     Feeling of Stress : Not at all   Housing Stability: Patient Declined (6/2/2025)    Received from [facility] Its Subsidiaries and Affiliates    Housing Stability Vital Sign     Unable to Pay for Housing in the Last Year: Patient declined     Homeless in the Last Year: Patient declined

## 2025-07-09 NOTE — PRE-PROCEDURE INSTRUCTIONS
Spoke with patient regarding procedure scheduled on 7/16     Arrival time 1230     Has patient been sick with fever or on antibiotics within the last 7 days? No     Does the patient have any open wounds, sores or rashes? No     Does the patient have any recent fractures? no     Has patient received a vaccination within the last 7 days? No     Received the COVID vaccination?      Has the patient stopped all medications as directed? na     Does patient have a pacemaker, defibrillator, or implantable stimulator? No     Does the patient have a ride to and from procedure and someone reliable to remain with patient? sister     Is the patient diabetic? no     Does the patient have sleep apnea? Or use O2 at home? no     Is the patient receiving sedation?      Is the patient instructed to remain NPO beginning at midnight the night before their procedure? yes     Procedure location confirmed with patient? Yes     Covid- Denies signs/symptoms. Instructed to notify PAT/MD if any changes.

## 2025-07-16 ENCOUNTER — HOSPITAL ENCOUNTER (OUTPATIENT)
Facility: HOSPITAL | Age: 65
Discharge: HOME | End: 2025-07-16
Attending: ANESTHESIOLOGY | Admitting: ANESTHESIOLOGY
Payer: COMMERCIAL

## 2025-07-16 VITALS
DIASTOLIC BLOOD PRESSURE: 61 MMHG | WEIGHT: 246.25 LBS | SYSTOLIC BLOOD PRESSURE: 112 MMHG | TEMPERATURE: 98 F | BODY MASS INDEX: 39.58 KG/M2 | RESPIRATION RATE: 16 BRPM | OXYGEN SATURATION: 99 % | HEART RATE: 70 BPM | HEIGHT: 66 IN

## 2025-07-16 DIAGNOSIS — M54.16 LUMBAR RADICULOPATHY, CHRONIC: ICD-10-CM

## 2025-07-16 PROCEDURE — 64484 NJX AA&/STRD TFRM EPI L/S EA: CPT | Mod: RT,,, | Performed by: ANESTHESIOLOGY

## 2025-07-16 PROCEDURE — 25500020 PHARM REV CODE 255: Performed by: ANESTHESIOLOGY

## 2025-07-16 PROCEDURE — 63600175 PHARM REV CODE 636 W HCPCS: Performed by: ANESTHESIOLOGY

## 2025-07-16 PROCEDURE — 64483 NJX AA&/STRD TFRM EPI L/S 1: CPT | Mod: RT,,, | Performed by: ANESTHESIOLOGY

## 2025-07-16 PROCEDURE — 64483 NJX AA&/STRD TFRM EPI L/S 1: CPT | Mod: RT | Performed by: ANESTHESIOLOGY

## 2025-07-16 PROCEDURE — 64484 NJX AA&/STRD TFRM EPI L/S EA: CPT | Mod: RT | Performed by: ANESTHESIOLOGY

## 2025-07-16 RX ORDER — BUPIVACAINE HYDROCHLORIDE 2.5 MG/ML
INJECTION, SOLUTION EPIDURAL; INFILTRATION; INTRACAUDAL; PERINEURAL
Status: DISCONTINUED | OUTPATIENT
Start: 2025-07-16 | End: 2025-07-16 | Stop reason: HOSPADM

## 2025-07-16 RX ORDER — DEXAMETHASONE SODIUM PHOSPHATE 10 MG/ML
INJECTION, SOLUTION INTRA-ARTICULAR; INTRALESIONAL; INTRAMUSCULAR; INTRAVENOUS; SOFT TISSUE
Status: DISCONTINUED | OUTPATIENT
Start: 2025-07-16 | End: 2025-07-16 | Stop reason: HOSPADM

## 2025-07-16 RX ORDER — MIDAZOLAM HYDROCHLORIDE 1 MG/ML
INJECTION, SOLUTION INTRAMUSCULAR; INTRAVENOUS
Status: DISCONTINUED | OUTPATIENT
Start: 2025-07-16 | End: 2025-07-16 | Stop reason: HOSPADM

## 2025-07-16 RX ORDER — FENTANYL CITRATE 50 UG/ML
INJECTION, SOLUTION INTRAMUSCULAR; INTRAVENOUS
Status: DISCONTINUED | OUTPATIENT
Start: 2025-07-16 | End: 2025-07-16 | Stop reason: HOSPADM

## 2025-07-16 NOTE — DISCHARGE SUMMARY
Discharge Note  Short Stay      SUMMARY     Admit Date: 7/16/2025    Attending Physician: Laverne Ramos MD        Discharge Physician: Laverne Ramos MD        Discharge Date: 7/16/2025 1:20 PM    Procedure(s) (LRB):  Right Lumbar L3/4 + L4/5 TF SHAYY (Right)    Final Diagnosis: Right lumbar radiculopathy [M54.16]  Degeneration of intervertebral disc of lumbar region with discogenic back pain and lower extremity pain [M51.362]    Disposition: Home or self care    Patient Instructions:   Current Discharge Medication List        CONTINUE these medications which have NOT CHANGED    Details   estradioL (ESTRACE) 1 MG tablet Take 1 tablet (1 mg total) by mouth once daily.  Qty: 30 tablet, Refills: 11    Comments: Refills 11: Year supply  Associated Diagnoses: Routine gynecological examination      progesterone (PROMETRIUM) 100 MG capsule Take 1 capsule by mouth once daily  Qty: 90 capsule, Refills: 0    Associated Diagnoses: Routine gynecological examination                 Discharge Diagnosis: Right lumbar radiculopathy [M54.16]  Degeneration of intervertebral disc of lumbar region with discogenic back pain and lower extremity pain [M51.362]  Condition on Discharge: Stable with no complications to procedure   Diet on Discharge: Same as before.  Activity: as per instruction sheet.  Discharge to: Home with a responsible adult.  Follow up: 2-4 weeks       Please call the office at [phone] if you experience any weakness or loss of sensation, fever > 101.5, pain uncontrolled with oral medications, persistent nausea/vomiting/or diarrhea, redness or drainage from the incisions, or any other worrisome concerns. If physician on call was not reached or could not communicate with our office for any reason please go to the nearest emergency department

## 2025-07-16 NOTE — PLAN OF CARE
Pt verbalized understanding of discharge instructions. Band aid x 1 to right lower back c/d/i. Patient voiced no complaints, with no further questions at this time. VSS on RA. Recovery care complete.

## 2025-07-16 NOTE — DISCHARGE INSTRUCTIONS

## 2025-07-16 NOTE — OP NOTE
Georgette Powell  64 y.o. female      Vitals:    07/16/25 1314   BP: 130/65   Pulse: 67   Resp: 18   Temp:      Procedure Date:07/16/2025        INFORMED CONSENT: The procedure, risks, benefits and options were discussed with patient. There are no contraindications to the procedure. The patient expressed understanding and agreed to proceed. The personnel performing the procedure was discussed. I verify that I personally obtained consent prior to the start of the procedure and the signed consent can be found on the patient's chart.       Anesthesia:   Conscious sedation provided by M.D    The patient was monitored with continuous pulse oximetry, EKG, and intermittent blood pressure monitors.  The patient was hemodynamically stable throughout the entire process was responsive to voice, and breathing spontaneously.  Supplemental O2 was provided at 2L/min via nasal cannula.  Patient was comfortable for the duration of the procedure. (See nurse documentation and case log for sedation time)    There was a total of 2mg IV Midazolam and 50mcg Fentanyl titrated for the procedure    Pre Procedure diagnosis: Right lumbar radiculopathy [M54.16]  Degeneration of intervertebral disc of lumbar region with discogenic back pain and lower extremity pain [M51.362]  Post-Procedure diagnosis: SAME     Complications: None    Specimens: None      DESCRIPTION OF PROCEDURE: The patient was brought to the procedure room. IV access was obtained prior to the procedure. The patient was positioned prone on the fluoroscopy table. Continuous hemodynamic monitoring was initiated including blood pressure, EKG, and pulse oximetry. . The skin was prepped with chlorhexidine and draped in a sterile fashion.      The  L3/4 and  L4/5 transforaminal spaces were identified with fluoroscopy in the  AP, oblique, and lateral views.  A 22 gauge spinal quinke needle was then advanced into the area of the trans foraminal spaces right with confirmation of proper  needle position using AP, oblique, and lateral fluoroscopic views. Once the needle tip was in the area of the transforaminal space, and there was no blood, CSF or paraesthesias,  1.5 mL of Omnipaque 300mg/ml was injected on right for a total of 3mL.  Fluoroscopic imaging in the AP and lateral views revealed a clear outline of the spinal nerve with proximal spread of agent through the neural foramen into the epidural space. A total combination of 2mL of Bupivacaine and 10 mg dexamethasone was injected on each side and at each level with displacement of the contrast dye confirming that the medication went into the area of the transforaminal spaces right. A sterile bandaid was applied.   Patient tolerated the procedure well.    Patient was taken back to the recovery room for further observation.     The patient was discharged to home in stable condition

## 2025-08-21 ENCOUNTER — PATIENT MESSAGE (OUTPATIENT)
Dept: PAIN MEDICINE | Facility: CLINIC | Age: 65
End: 2025-08-21
Payer: COMMERCIAL